# Patient Record
Sex: MALE | Race: WHITE | NOT HISPANIC OR LATINO | Employment: OTHER | RURAL
[De-identification: names, ages, dates, MRNs, and addresses within clinical notes are randomized per-mention and may not be internally consistent; named-entity substitution may affect disease eponyms.]

---

## 2020-09-21 ENCOUNTER — HISTORICAL (OUTPATIENT)
Dept: ADMINISTRATIVE | Facility: HOSPITAL | Age: 70
End: 2020-09-21

## 2021-12-20 ENCOUNTER — OFFICE VISIT (OUTPATIENT)
Dept: FAMILY MEDICINE | Facility: CLINIC | Age: 71
End: 2021-12-20
Payer: MEDICARE

## 2021-12-20 VITALS
HEART RATE: 82 BPM | BODY MASS INDEX: 25.62 KG/M2 | WEIGHT: 173 LBS | TEMPERATURE: 98 F | HEIGHT: 69 IN | DIASTOLIC BLOOD PRESSURE: 60 MMHG | SYSTOLIC BLOOD PRESSURE: 128 MMHG | OXYGEN SATURATION: 93 % | RESPIRATION RATE: 18 BRPM

## 2021-12-20 DIAGNOSIS — J44.1 COPD WITH ACUTE EXACERBATION: Primary | ICD-10-CM

## 2021-12-20 PROCEDURE — 96372 THER/PROPH/DIAG INJ SC/IM: CPT | Mod: ,,, | Performed by: NURSE PRACTITIONER

## 2021-12-20 PROCEDURE — 99213 PR OFFICE/OUTPT VISIT, EST, LEVL III, 20-29 MIN: ICD-10-PCS | Mod: 25,,, | Performed by: NURSE PRACTITIONER

## 2021-12-20 PROCEDURE — 99213 OFFICE O/P EST LOW 20 MIN: CPT | Mod: 25,,, | Performed by: NURSE PRACTITIONER

## 2021-12-20 PROCEDURE — 96372 PR INJECTION,THERAP/PROPH/DIAG2ST, IM OR SUBCUT: ICD-10-PCS | Mod: ,,, | Performed by: NURSE PRACTITIONER

## 2021-12-20 RX ORDER — LEVALBUTEROL TARTRATE 45 UG/1
AEROSOL, METERED ORAL
COMMUNITY
Start: 2021-11-26 | End: 2023-11-28

## 2021-12-20 RX ORDER — ROFLUMILAST 500 UG/1
1 TABLET ORAL DAILY
COMMUNITY
Start: 2021-11-29

## 2021-12-20 RX ORDER — DOXYCYCLINE 100 MG/1
100 CAPSULE ORAL EVERY 12 HOURS
Qty: 14 CAPSULE | Refills: 0 | Status: SHIPPED | OUTPATIENT
Start: 2021-12-20 | End: 2022-04-14

## 2021-12-20 RX ORDER — METHYLPREDNISOLONE ACETATE 40 MG/ML
40 INJECTION, SUSPENSION INTRA-ARTICULAR; INTRALESIONAL; INTRAMUSCULAR; SOFT TISSUE
Status: COMPLETED | OUTPATIENT
Start: 2021-12-20 | End: 2021-12-20

## 2021-12-20 RX ORDER — DEXAMETHASONE SODIUM PHOSPHATE 4 MG/ML
4 INJECTION, SOLUTION INTRA-ARTICULAR; INTRALESIONAL; INTRAMUSCULAR; INTRAVENOUS; SOFT TISSUE
Status: COMPLETED | OUTPATIENT
Start: 2021-12-20 | End: 2021-12-20

## 2021-12-20 RX ORDER — LEVALBUTEROL INHALATION SOLUTION 1.25 MG/3ML
SOLUTION RESPIRATORY (INHALATION)
COMMUNITY
Start: 2021-11-29

## 2021-12-20 RX ORDER — TAMSULOSIN HYDROCHLORIDE 0.4 MG/1
1 CAPSULE ORAL DAILY
COMMUNITY
Start: 2021-11-26

## 2021-12-20 RX ORDER — ALBUTEROL SULFATE 90 UG/1
AEROSOL, METERED RESPIRATORY (INHALATION)
COMMUNITY
Start: 2021-08-09 | End: 2023-11-28

## 2021-12-20 RX ADMIN — DEXAMETHASONE SODIUM PHOSPHATE 4 MG: 4 INJECTION, SOLUTION INTRA-ARTICULAR; INTRALESIONAL; INTRAMUSCULAR; INTRAVENOUS; SOFT TISSUE at 10:12

## 2021-12-20 RX ADMIN — METHYLPREDNISOLONE ACETATE 40 MG: 40 INJECTION, SUSPENSION INTRA-ARTICULAR; INTRALESIONAL; INTRAMUSCULAR; SOFT TISSUE at 10:12

## 2022-03-03 ENCOUNTER — APPOINTMENT (OUTPATIENT)
Dept: RADIOLOGY | Facility: CLINIC | Age: 72
End: 2022-03-03
Attending: NURSE PRACTITIONER
Payer: MEDICARE

## 2022-03-03 ENCOUNTER — OFFICE VISIT (OUTPATIENT)
Dept: FAMILY MEDICINE | Facility: CLINIC | Age: 72
End: 2022-03-03
Payer: MEDICARE

## 2022-03-03 VITALS
SYSTOLIC BLOOD PRESSURE: 130 MMHG | DIASTOLIC BLOOD PRESSURE: 70 MMHG | HEART RATE: 78 BPM | BODY MASS INDEX: 25.1 KG/M2 | HEIGHT: 69 IN | OXYGEN SATURATION: 94 % | TEMPERATURE: 98 F | WEIGHT: 169.5 LBS | RESPIRATION RATE: 18 BRPM

## 2022-03-03 DIAGNOSIS — R05.9 COUGH: ICD-10-CM

## 2022-03-03 DIAGNOSIS — R06.02 SHORTNESS OF BREATH: ICD-10-CM

## 2022-03-03 DIAGNOSIS — J44.1 COPD WITH ACUTE EXACERBATION: Primary | ICD-10-CM

## 2022-03-03 DIAGNOSIS — J01.00 ACUTE NON-RECURRENT MAXILLARY SINUSITIS: ICD-10-CM

## 2022-03-03 PROBLEM — F17.210 CIGARETTE SMOKER: Status: ACTIVE | Noted: 2022-03-03

## 2022-03-03 LAB
ALBUMIN SERPL BCP-MCNC: 3.4 G/DL (ref 3.5–5)
ALBUMIN/GLOB SERPL: 1.3 {RATIO}
ALP SERPL-CCNC: 87 U/L (ref 45–115)
ALT SERPL W P-5'-P-CCNC: 21 U/L (ref 16–61)
ANION GAP SERPL CALCULATED.3IONS-SCNC: 9 MMOL/L (ref 7–16)
AST SERPL W P-5'-P-CCNC: 15 U/L (ref 15–37)
BASOPHILS # BLD AUTO: 0.03 K/UL (ref 0–0.2)
BASOPHILS NFR BLD AUTO: 0.4 % (ref 0–1)
BILIRUB SERPL-MCNC: 0.8 MG/DL (ref 0–1.2)
BUN SERPL-MCNC: 6 MG/DL (ref 7–18)
BUN/CREAT SERPL: 8 (ref 6–20)
CALCIUM SERPL-MCNC: 8.3 MG/DL (ref 8.5–10.1)
CHLORIDE SERPL-SCNC: 104 MMOL/L (ref 98–107)
CO2 SERPL-SCNC: 36 MMOL/L (ref 21–32)
CREAT SERPL-MCNC: 0.77 MG/DL (ref 0.7–1.3)
CTP QC/QA: YES
DIFFERENTIAL METHOD BLD: ABNORMAL
EOSINOPHIL # BLD AUTO: 0.24 K/UL (ref 0–0.5)
EOSINOPHIL NFR BLD AUTO: 3.1 % (ref 1–4)
ERYTHROCYTE [DISTWIDTH] IN BLOOD BY AUTOMATED COUNT: 13.6 % (ref 11.5–14.5)
FLUAV AG NPH QL: NEGATIVE
FLUBV AG NPH QL: NEGATIVE
GLOBULIN SER-MCNC: 2.6 G/DL (ref 2–4)
GLUCOSE SERPL-MCNC: 101 MG/DL (ref 74–106)
HCT VFR BLD AUTO: 47.4 % (ref 40–54)
HGB BLD-MCNC: 15.7 G/DL (ref 13.5–18)
LYMPHOCYTES # BLD AUTO: 0.92 K/UL (ref 1–4.8)
LYMPHOCYTES NFR BLD AUTO: 11.7 % (ref 27–41)
MCH RBC QN AUTO: 29.4 PG (ref 27–31)
MCHC RBC AUTO-ENTMCNC: 33.1 G/DL (ref 32–36)
MCV RBC AUTO: 88.8 FL (ref 80–96)
MONOCYTES # BLD AUTO: 0.59 K/UL (ref 0–0.8)
MONOCYTES NFR BLD AUTO: 7.5 % (ref 2–6)
MPC BLD CALC-MCNC: 10.2 FL (ref 9.4–12.4)
NEUTROPHILS # BLD AUTO: 6.06 K/UL (ref 1.8–7.7)
NEUTROPHILS NFR BLD AUTO: 77.3 % (ref 53–65)
PLATELET # BLD AUTO: 199 K/UL (ref 150–400)
POTASSIUM SERPL-SCNC: 3.8 MMOL/L (ref 3.5–5.1)
PROT SERPL-MCNC: 6 G/DL (ref 6.4–8.2)
RBC # BLD AUTO: 5.34 M/UL (ref 4.6–6.2)
SARS-COV-2 AG RESP QL IA.RAPID: NEGATIVE
SODIUM SERPL-SCNC: 145 MMOL/L (ref 136–145)
WBC # BLD AUTO: 7.84 K/UL (ref 4.5–11)

## 2022-03-03 PROCEDURE — 96372 PR INJECTION,THERAP/PROPH/DIAG2ST, IM OR SUBCUT: ICD-10-PCS | Mod: ,,, | Performed by: NURSE PRACTITIONER

## 2022-03-03 PROCEDURE — 87428 SARSCOV & INF VIR A&B AG IA: CPT | Mod: RHCUB | Performed by: NURSE PRACTITIONER

## 2022-03-03 PROCEDURE — 96372 THER/PROPH/DIAG INJ SC/IM: CPT | Mod: ,,, | Performed by: NURSE PRACTITIONER

## 2022-03-03 PROCEDURE — 94640 AIRWAY INHALATION TREATMENT: CPT | Mod: ,,, | Performed by: NURSE PRACTITIONER

## 2022-03-03 PROCEDURE — 71046 X-RAY EXAM CHEST 2 VIEWS: CPT | Mod: 26,,, | Performed by: RADIOLOGY

## 2022-03-03 PROCEDURE — 71046 X-RAY EXAM CHEST 2 VIEWS: CPT | Mod: TC,RHCUB | Performed by: NURSE PRACTITIONER

## 2022-03-03 PROCEDURE — 94640 PR INHAL RX, AIRWAY OBST/DX SPUTUM INDUCT: ICD-10-PCS | Mod: ,,, | Performed by: NURSE PRACTITIONER

## 2022-03-03 PROCEDURE — 71046 XR CHEST PA AND LATERAL: ICD-10-PCS | Mod: 26,,, | Performed by: RADIOLOGY

## 2022-03-03 PROCEDURE — 99214 PR OFFICE/OUTPT VISIT, EST, LEVL IV, 30-39 MIN: ICD-10-PCS | Mod: ,,, | Performed by: NURSE PRACTITIONER

## 2022-03-03 PROCEDURE — 99214 OFFICE O/P EST MOD 30 MIN: CPT | Mod: ,,, | Performed by: NURSE PRACTITIONER

## 2022-03-03 RX ORDER — LINCOMYCIN HYDROCHLORIDE 300 MG/ML
600 INJECTION, SOLUTION INTRAMUSCULAR; INTRAVENOUS; SUBCONJUNCTIVAL
Status: COMPLETED | OUTPATIENT
Start: 2022-03-03 | End: 2022-03-03

## 2022-03-03 RX ORDER — IPRATROPIUM BROMIDE AND ALBUTEROL SULFATE 2.5; .5 MG/3ML; MG/3ML
3 SOLUTION RESPIRATORY (INHALATION) EVERY 6 HOURS PRN
Qty: 75 ML | Refills: 0 | Status: SHIPPED | OUTPATIENT
Start: 2022-03-03 | End: 2022-03-03

## 2022-03-03 RX ORDER — CHLORPHENIRAMINE MALEATE AND PHENYLEPHRINE HYDROCHLORIDE 4; 10 MG/1; MG/1
1 TABLET, COATED ORAL EVERY 6 HOURS PRN
Qty: 30 TABLET | Refills: 0 | Status: SHIPPED | OUTPATIENT
Start: 2022-03-03 | End: 2022-03-13

## 2022-03-03 RX ORDER — PREDNISONE 10 MG/1
10 TABLET ORAL DAILY
Qty: 10 TABLET | Refills: 0 | Status: SHIPPED | OUTPATIENT
Start: 2022-03-03 | End: 2022-04-14

## 2022-03-03 RX ORDER — IPRATROPIUM BROMIDE AND ALBUTEROL SULFATE 2.5; .5 MG/3ML; MG/3ML
3 SOLUTION RESPIRATORY (INHALATION)
Status: COMPLETED | OUTPATIENT
Start: 2022-03-03 | End: 2022-03-03

## 2022-03-03 RX ORDER — DOXYCYCLINE 100 MG/1
100 CAPSULE ORAL 2 TIMES DAILY
Qty: 20 CAPSULE | Refills: 0 | Status: SHIPPED | OUTPATIENT
Start: 2022-03-03 | End: 2022-04-14

## 2022-03-03 RX ORDER — DEXAMETHASONE SODIUM PHOSPHATE 4 MG/ML
4 INJECTION, SOLUTION INTRA-ARTICULAR; INTRALESIONAL; INTRAMUSCULAR; INTRAVENOUS; SOFT TISSUE
Status: COMPLETED | OUTPATIENT
Start: 2022-03-03 | End: 2022-03-03

## 2022-03-03 RX ADMIN — DEXAMETHASONE SODIUM PHOSPHATE 4 MG: 4 INJECTION, SOLUTION INTRA-ARTICULAR; INTRALESIONAL; INTRAMUSCULAR; INTRAVENOUS; SOFT TISSUE at 10:03

## 2022-03-03 RX ADMIN — IPRATROPIUM BROMIDE AND ALBUTEROL SULFATE 3 ML: 2.5; .5 SOLUTION RESPIRATORY (INHALATION) at 12:03

## 2022-03-03 RX ADMIN — LINCOMYCIN HYDROCHLORIDE 600 MG: 300 INJECTION, SOLUTION INTRAMUSCULAR; INTRAVENOUS; SUBCONJUNCTIVAL at 10:03

## 2022-03-03 NOTE — PROGRESS NOTES
Ofe Oliver NP   [facility]  [address]  [place]     PATIENT NAME: Geoffrey Gleason  : 1950  DATE: 3/3/22  MRN: [id number]      Billing Provider: Ofe Oliver NP  Level of Service:   Patient PCP Information     Provider PCP Type    Ofe Oliver NP General          Reason for Visit / Chief Complaint: Follow-up, COPD, Nasal Congestion, Cough, and Headache       Update PCP  Update Chief Complaint         History of Present Illness / Problem Focused Workflow     Geoffrey Gleason presents to the clinic copd, nasal congestion, cough, headache for a couple of days, o2 sats 78-87%      Review of Systems     Review of Systems   Constitutional: Negative for chills, fatigue and fever.   HENT: Positive for sinus pressure/congestion. Negative for nasal congestion, ear pain, facial swelling, hearing loss, mouth dryness, mouth sores, postnasal drip, rhinorrhea and goiter.    Eyes: Negative for discharge and itching.   Respiratory: Positive for cough and shortness of breath. Negative for wheezing.    Cardiovascular: Negative for chest pain and leg swelling.   Gastrointestinal: Negative for abdominal pain, change in bowel habit and change in bowel habit.   Genitourinary: Negative for difficulty urinating, dysuria, enuresis, frequency, hematuria and urgency.   Neurological: Positive for headaches. Negative for dizziness, vertigo, syncope and weakness.   Psychiatric/Behavioral: Negative for decreased concentration.        Medical / Social / Family History     Past Medical History:   Diagnosis Date    COPD (chronic obstructive pulmonary disease)        History reviewed. No pertinent surgical history.    Social History    reports that he has been smoking cigarettes. He has never used smokeless tobacco. He reports previous alcohol use. He reports that he does not use drugs.    Family History  's family history is not on file.    Medications and Allergies     Medications  Outpatient Medications Marked  "as Taking for the 3/3/22 encounter (Office Visit) with Ofe Oliver NP   Medication Sig Dispense Refill    albuterol (PROVENTIL/VENTOLIN HFA) 90 mcg/actuation inhaler INHALE TWO PUFFS TWICE DAILY AS NEEDED      DALIRESP 500 mcg Tab Take 1 tablet by mouth once daily.      doxycycline (MONODOX) 100 MG capsule Take 1 capsule (100 mg total) by mouth every 12 (twelve) hours. 14 capsule 0    gabapentin (NEURONTIN) 300 MG capsule TAKE ONE CAPSULE BY MOUTH AT BEDTIME 90 capsule 0    levalbuterol (XOPENEX HFA) 45 mcg/actuation inhaler INHALE ONE PUFF BY MOUTH EVERY 4 HOURS AS NEEDED      levalbuterol (XOPENEX) 1.25 mg/3 mL nebulizer solution USE ONE vial via NEBULIZER EVERY 8 HOURS AS NEEDED      tamsulosin (FLOMAX) 0.4 mg Cap Take 1 capsule by mouth once daily.       Current Facility-Administered Medications for the 3/3/22 encounter (Office Visit) with Ofe Oliver NP   Medication Dose Route Frequency Provider Last Rate Last Admin    [COMPLETED] dexamethasone injection 4 mg  4 mg Intramuscular 1 time in Clinic/HOD Ofe Oliver NP   4 mg at 03/03/22 1016    [COMPLETED] lincomycin injection 600 mg  600 mg Intramuscular 1 time in Clinic/HOD Ofe Oliver NP   600 mg at 03/03/22 1017       Allergies  Review of patient's allergies indicates:   Allergen Reactions    Cephalexin Hives and Other (See Comments)     Swelling SOB      Amoxicillin Rash       Physical Examination     Vitals:    03/03/22 0942 03/03/22 1021   BP: 130/70    BP Location: Right arm    Patient Position: Sitting    BP Method: Medium (Manual)    Pulse: 78    Resp: 18    Temp: 98.4 °F (36.9 °C)    TempSrc: Oral    SpO2: (!) 87% (!) 94%  Comment: on o2 @ 2 liters   Weight: 76.9 kg (169 lb 8 oz)    Height: 5' 9" (1.753 m)       Physical Exam  Vitals and nursing note reviewed.   Constitutional:       Appearance: Normal appearance.   HENT:      Head: Normocephalic.      Right Ear: Tympanic membrane, ear canal and external ear normal.      Left " Ear: Tympanic membrane, ear canal and external ear normal.      Nose: Nose normal.      Comments: Mod amt thick yellow nasal secretion, pressure over max region     Mouth/Throat:      Mouth: Mucous membranes are moist.      Pharynx: Oropharynx is clear.   Eyes:      Extraocular Movements: Extraocular movements intact.      Conjunctiva/sclera: Conjunctivae normal.      Pupils: Pupils are equal, round, and reactive to light.   Neck:      Comments: Bhavin ant cervical nodes  Cardiovascular:      Rate and Rhythm: Normal rate and regular rhythm.      Pulses: Normal pulses.      Heart sounds: Normal heart sounds.   Pulmonary:      Effort: Pulmonary effort is normal.      Breath sounds: Normal breath sounds.      Comments: Coarse breath sounds noted, sl cleared after 1 hhn with duoneb. o2 started at 2 liters, and o2 sat increased to 92%  Abdominal:      General: Bowel sounds are normal.      Palpations: Abdomen is soft.   Musculoskeletal:         General: Normal range of motion.      Cervical back: Normal range of motion and neck supple.   Lymphadenopathy:      Cervical: Cervical adenopathy present.   Skin:     General: Skin is warm and dry.      Capillary Refill: Capillary refill takes less than 2 seconds.   Neurological:      General: No focal deficit present.      Mental Status: He is alert and oriented to person, place, and time.   Psychiatric:         Mood and Affect: Mood normal.         Behavior: Behavior normal.          Assessment and Plan (including Health Maintenance)      Problem List  Smart Sets  Document Outside HM   :    Plan: avoid irritants, discussed need to stop smoking, but pt has no desire to quit at this time, stressed to use o2 daily, go to er if needed    Cough  -     X-Ray Chest PA And Lateral; Future; Expected date: 03/03/2022    Shortness of breath  -     X-Ray Chest PA And Lateral; Future; Expected date: 03/03/2022  -     CBC Auto Differential; Future; Expected date: 03/03/2022  -     Comprehensive  Metabolic Panel; Future; Expected date: 03/03/2022  -     POCT SARS-COV2 (COVID) with Flu Antigen    COPD with acute exacerbation    Other orders  -     lincomycin injection 600 mg  -     dexamethasone injection 4 mg            Health Maintenance Due   Topic Date Due    Hepatitis C Screening  Never done    Lipid Panel  Never done    COVID-19 Vaccine (1) Never done    TETANUS VACCINE  Never done    Colorectal Cancer Screening  Never done    Shingles Vaccine (1 of 2) Never done    Abdominal Aortic Aneurysm Screening  Never done    Pneumococcal Vaccines (Age 65+) (1 of 1 - PPSV23) 09/01/2016       Problem List Items Addressed This Visit    None     Visit Diagnoses     Cough    -  Primary    Relevant Orders    X-Ray Chest PA And Lateral (Completed)    Shortness of breath        Relevant Orders    X-Ray Chest PA And Lateral (Completed)    CBC Auto Differential (Completed)    Comprehensive Metabolic Panel    POCT SARS-COV2 (COVID) with Flu Antigen (Completed)    COPD with acute exacerbation                The patient has no Health Maintenance topics of status Not Due    Procedures     No future appointments.     No follow-ups on file.       Signature:  Ofe Oliver NP    Date of encounter: 3/3/22

## 2022-03-10 ENCOUNTER — OFFICE VISIT (OUTPATIENT)
Dept: FAMILY MEDICINE | Facility: CLINIC | Age: 72
End: 2022-03-10
Payer: MEDICARE

## 2022-03-10 VITALS
SYSTOLIC BLOOD PRESSURE: 136 MMHG | OXYGEN SATURATION: 93 % | HEART RATE: 87 BPM | BODY MASS INDEX: 25.21 KG/M2 | TEMPERATURE: 98 F | HEIGHT: 69 IN | RESPIRATION RATE: 18 BRPM | WEIGHT: 170.25 LBS | DIASTOLIC BLOOD PRESSURE: 70 MMHG

## 2022-03-10 DIAGNOSIS — J44.1 CHRONIC OBSTRUCTIVE PULMONARY DISEASE WITH ACUTE EXACERBATION: Primary | ICD-10-CM

## 2022-03-10 PROCEDURE — 96372 THER/PROPH/DIAG INJ SC/IM: CPT | Mod: ,,, | Performed by: NURSE PRACTITIONER

## 2022-03-10 PROCEDURE — 99214 PR OFFICE/OUTPT VISIT, EST, LEVL IV, 30-39 MIN: ICD-10-PCS | Mod: ,,, | Performed by: NURSE PRACTITIONER

## 2022-03-10 PROCEDURE — 99214 OFFICE O/P EST MOD 30 MIN: CPT | Mod: ,,, | Performed by: NURSE PRACTITIONER

## 2022-03-10 PROCEDURE — 96372 PR INJECTION,THERAP/PROPH/DIAG2ST, IM OR SUBCUT: ICD-10-PCS | Mod: ,,, | Performed by: NURSE PRACTITIONER

## 2022-03-10 RX ORDER — DEXAMETHASONE SODIUM PHOSPHATE 4 MG/ML
4 INJECTION, SOLUTION INTRA-ARTICULAR; INTRALESIONAL; INTRAMUSCULAR; INTRAVENOUS; SOFT TISSUE
Status: COMPLETED | OUTPATIENT
Start: 2022-03-10 | End: 2022-03-10

## 2022-03-10 RX ORDER — METHYLPREDNISOLONE ACETATE 40 MG/ML
40 INJECTION, SUSPENSION INTRA-ARTICULAR; INTRALESIONAL; INTRAMUSCULAR; SOFT TISSUE
Status: COMPLETED | OUTPATIENT
Start: 2022-03-10 | End: 2022-03-10

## 2022-03-10 RX ORDER — METHYLPREDNISOLONE 4 MG/1
TABLET ORAL
Qty: 21 EACH | Refills: 0 | Status: SHIPPED | OUTPATIENT
Start: 2022-03-10 | End: 2022-03-31

## 2022-03-10 RX ORDER — AZITHROMYCIN 250 MG/1
TABLET, FILM COATED ORAL
Qty: 6 TABLET | Refills: 0 | Status: SHIPPED | OUTPATIENT
Start: 2022-03-10 | End: 2022-03-15

## 2022-03-10 RX ADMIN — METHYLPREDNISOLONE ACETATE 40 MG: 40 INJECTION, SUSPENSION INTRA-ARTICULAR; INTRALESIONAL; INTRAMUSCULAR; SOFT TISSUE at 12:03

## 2022-03-10 RX ADMIN — DEXAMETHASONE SODIUM PHOSPHATE 4 MG: 4 INJECTION, SOLUTION INTRA-ARTICULAR; INTRALESIONAL; INTRAMUSCULAR; INTRAVENOUS; SOFT TISSUE at 12:03

## 2022-03-10 NOTE — PROGRESS NOTES
Ofe Oliver NP   [facility]  [address]  [place]     PATIENT NAME: Geoffrey Gleason  : 1950  DATE: 3/10/22  MRN: [id number]      Billing Provider: Ofe Oliver NP  Level of Service:   Patient PCP Information     Provider PCP Type    Ofe Oliver NP General          Reason for Visit / Chief Complaint: Follow-up (One week follow up ) and COPD       Update PCP  Update Chief Complaint         History of Present Illness / Problem Focused Workflow     Geoffrey Gleason presents to the clinic   Here for eval of exac of copd, stated that he is feeling some better, o2 sats have been staying in the high 80s    Review of Systems     Review of Systems   Constitutional: Negative for chills, fatigue and fever.   HENT: Negative for nasal congestion, ear pain, facial swelling, hearing loss, mouth dryness, mouth sores, postnasal drip, rhinorrhea, sinus pressure/congestion and goiter.    Eyes: Negative for discharge and itching.   Respiratory: Positive for cough and shortness of breath (mild). Negative for wheezing.    Cardiovascular: Negative for chest pain and leg swelling.   Gastrointestinal: Negative for abdominal pain, change in bowel habit and change in bowel habit.   Genitourinary: Negative for difficulty urinating, dysuria, enuresis, frequency, hematuria and urgency.   Neurological: Negative for dizziness, vertigo, syncope, weakness and headaches.   Psychiatric/Behavioral: Negative for decreased concentration.        Medical / Social / Family History     Past Medical History:   Diagnosis Date    COPD (chronic obstructive pulmonary disease)        History reviewed. No pertinent surgical history.    Social History    reports that he has been smoking cigarettes. He has never used smokeless tobacco. He reports previous alcohol use. He reports that he does not use drugs.    Family History  's family history is not on file.    Medications and Allergies     Medications  Outpatient Medications  "Marked as Taking for the 3/10/22 encounter (Office Visit) with Ofe Oliver NP   Medication Sig Dispense Refill    albuterol (PROVENTIL/VENTOLIN HFA) 90 mcg/actuation inhaler INHALE TWO PUFFS TWICE DAILY AS NEEDED      chlorpheniramine-phenylephrine (ED A-HIST) 4-10 mg per tablet Take 1 tablet by mouth every 6 (six) hours as needed for Congestion. 30 tablet 0    DALIRESP 500 mcg Tab Take 1 tablet by mouth once daily.      doxycycline (MONODOX) 100 MG capsule Take 1 capsule (100 mg total) by mouth every 12 (twelve) hours. 14 capsule 0    gabapentin (NEURONTIN) 300 MG capsule TAKE ONE CAPSULE BY MOUTH AT BEDTIME 90 capsule 0    levalbuterol (XOPENEX HFA) 45 mcg/actuation inhaler INHALE ONE PUFF BY MOUTH EVERY 4 HOURS AS NEEDED      levalbuterol (XOPENEX) 1.25 mg/3 mL nebulizer solution USE ONE vial via NEBULIZER EVERY 8 HOURS AS NEEDED      predniSONE (DELTASONE) 10 MG tablet Take 1 tablet (10 mg total) by mouth once daily. 10 tablet 0    tamsulosin (FLOMAX) 0.4 mg Cap Take 1 capsule by mouth once daily.       Current Facility-Administered Medications for the 3/10/22 encounter (Office Visit) with Ofe Oliver NP   Medication Dose Route Frequency Provider Last Rate Last Admin    [COMPLETED] dexamethasone injection 4 mg  4 mg Intramuscular 1 time in Clinic/HOD Ofe Oliver NP   4 mg at 03/10/22 1216    [COMPLETED] methylPREDNISolone acetate injection 40 mg  40 mg Intramuscular 1 time in Clinic/HOD Ofe Oliver NP   40 mg at 03/10/22 1217       Allergies  Review of patient's allergies indicates:   Allergen Reactions    Cephalexin Hives and Other (See Comments)     Swelling SOB      Amoxicillin Rash       Physical Examination     Vitals:    03/10/22 1141   BP: 136/70   BP Location: Left arm   Patient Position: Sitting   BP Method: Medium (Manual)   Pulse: 87   Resp: 18   Temp: 97.9 °F (36.6 °C)   TempSrc: Oral   SpO2: (!) 93%   Weight: 77.2 kg (170 lb 4 oz)   Height: 5' 9" (1.753 m)      Physical " Exam  Vitals and nursing note reviewed.   Constitutional:       Appearance: Normal appearance.   HENT:      Head: Normocephalic.      Right Ear: Tympanic membrane, ear canal and external ear normal.      Left Ear: Tympanic membrane, ear canal and external ear normal.      Nose: Nose normal.      Mouth/Throat:      Mouth: Mucous membranes are moist.      Pharynx: Oropharynx is clear.   Eyes:      Extraocular Movements: Extraocular movements intact.      Conjunctiva/sclera: Conjunctivae normal.      Pupils: Pupils are equal, round, and reactive to light.   Cardiovascular:      Rate and Rhythm: Normal rate and regular rhythm.      Pulses: Normal pulses.      Heart sounds: Normal heart sounds.   Pulmonary:      Effort: Pulmonary effort is normal.      Breath sounds: Normal breath sounds.      Comments: Sl decreased resp  Abdominal:      General: Bowel sounds are normal.      Palpations: Abdomen is soft.   Musculoskeletal:         General: Normal range of motion.   Skin:     General: Skin is warm and dry.      Capillary Refill: Capillary refill takes less than 2 seconds.   Neurological:      General: No focal deficit present.      Mental Status: He is alert and oriented to person, place, and time.   Psychiatric:         Mood and Affect: Mood normal.         Behavior: Behavior normal.          Assessment and Plan (including Health Maintenance)      Problem List  Smart Sets  Document Outside HM   :    Plan: avoid irritants, meds as ordered, return to clinic as scheduled and as needed    Chronic obstructive pulmonary disease with acute exacerbation  -     dexamethasone injection 4 mg  -     methylPREDNISolone acetate injection 40 mg    Other orders  -     methylPREDNISolone (MEDROL DOSEPACK) 4 mg tablet; use as directed  Dispense: 21 each; Refill: 0  -     azithromycin (Z-MARCIA) 250 MG tablet; Take 2 tablets by mouth on day 1; Take 1 tablet by mouth on days 2-5  Dispense: 6 tablet; Refill: 0            Health Maintenance Due    Topic Date Due    Hepatitis C Screening  Never done    Lipid Panel  Never done    COVID-19 Vaccine (1) Never done    TETANUS VACCINE  Never done    Colorectal Cancer Screening  Never done    Shingles Vaccine (1 of 2) Never done    Abdominal Aortic Aneurysm Screening  Never done    Pneumococcal Vaccines (Age 65+) (1 of 1 - PPSV23) 09/01/2016       Problem List Items Addressed This Visit        Pulmonary    Chronic obstructive pulmonary disease - Primary    Relevant Medications    dexamethasone injection 4 mg (Completed)    methylPREDNISolone acetate injection 40 mg (Completed)            The patient has no Health Maintenance topics of status Not Due    Procedures     Future Appointments   Date Time Provider Department Center   4/11/2022  9:30 AM AWV NURSE, [facility]        Follow up for as scheduled.       Signature:  Ofe Oliver NP    Date of encounter: 3/10/22

## 2022-03-11 DIAGNOSIS — Z71.89 COMPLEX CARE COORDINATION: ICD-10-CM

## 2022-04-14 ENCOUNTER — OFFICE VISIT (OUTPATIENT)
Dept: FAMILY MEDICINE | Facility: CLINIC | Age: 72
End: 2022-04-14
Payer: MEDICARE

## 2022-04-14 VITALS
SYSTOLIC BLOOD PRESSURE: 114 MMHG | TEMPERATURE: 99 F | BODY MASS INDEX: 25.35 KG/M2 | HEIGHT: 69 IN | OXYGEN SATURATION: 92 % | DIASTOLIC BLOOD PRESSURE: 60 MMHG | RESPIRATION RATE: 22 BRPM | WEIGHT: 171.13 LBS | HEART RATE: 100 BPM

## 2022-04-14 DIAGNOSIS — M25.472 SWELLING OF BOTH ANKLES: ICD-10-CM

## 2022-04-14 DIAGNOSIS — L03.818 CELLULITIS OF OTHER SPECIFIED SITE: ICD-10-CM

## 2022-04-14 DIAGNOSIS — M25.471 SWELLING OF BOTH ANKLES: ICD-10-CM

## 2022-04-14 DIAGNOSIS — R06.02 SHORTNESS OF BREATH: Primary | ICD-10-CM

## 2022-04-14 PROBLEM — L03.90 CELLULITIS: Status: ACTIVE | Noted: 2022-04-14

## 2022-04-14 LAB
BILIRUB SERPL-MCNC: NEGATIVE MG/DL
BLOOD URINE, POC: NEGATIVE
COLOR, POC UA: YELLOW
GLUCOSE UR QL STRIP: NEGATIVE
KETONES UR QL STRIP: NEGATIVE
LEUKOCYTE ESTERASE URINE, POC: NEGATIVE
NITRITE, POC UA: NEGATIVE
PH, POC UA: 7
PROTEIN, POC: NEGATIVE
SPECIFIC GRAVITY, POC UA: 1.01
UROBILINOGEN, POC UA: 2

## 2022-04-14 PROCEDURE — 99214 OFFICE O/P EST MOD 30 MIN: CPT | Mod: ,,, | Performed by: NURSE PRACTITIONER

## 2022-04-14 PROCEDURE — 81003 URINALYSIS AUTO W/O SCOPE: CPT | Mod: RHCUB | Performed by: NURSE PRACTITIONER

## 2022-04-14 PROCEDURE — 99214 PR OFFICE/OUTPT VISIT, EST, LEVL IV, 30-39 MIN: ICD-10-PCS | Mod: ,,, | Performed by: NURSE PRACTITIONER

## 2022-04-14 RX ORDER — IPRATROPIUM BROMIDE AND ALBUTEROL SULFATE 2.5; .5 MG/3ML; MG/3ML
SOLUTION RESPIRATORY (INHALATION) EVERY 6 HOURS PRN
COMMUNITY
Start: 2022-03-03 | End: 2023-11-28

## 2022-04-14 RX ORDER — FUROSEMIDE 20 MG/1
TABLET ORAL
Qty: 30 TABLET | Refills: 11 | Status: SHIPPED | OUTPATIENT
Start: 2022-04-14 | End: 2022-11-07

## 2022-04-14 RX ORDER — DOXYCYCLINE 100 MG/1
100 CAPSULE ORAL EVERY 12 HOURS
Qty: 20 CAPSULE | Refills: 0 | Status: SHIPPED | OUTPATIENT
Start: 2022-04-14 | End: 2022-11-07

## 2022-04-14 NOTE — PROGRESS NOTES
Ofe Oliver NP   [facility]  [address]  [place]     PATIENT NAME: Geoffrey Gleason  : 1950  DATE: 22  MRN: [id number]      Billing Provider: Ofe Oliver NP  Level of Service:   Patient PCP Information     Provider PCP Type    Ofe Oliver NP General          Reason for Visit / Chief Complaint: Leg Pain (C/o pain and swelling of lower legs and feet x several weeks.  States his legs and feet are ok in the morninigs but his legs and feet swell and hurt by the end of the day.  C/o skin on legs and feet are red, itch and burn)       Update PCP  Update Chief Complaint         History of Present Illness / Problem Focused Workflow     Geoffrey Gleason presents to the clinic   C/o leg pain, c/o pain and swelling of lower legs and feet x several weeks, staes his legs and feet are ok in the am but by the end of day they hurt and are swollen. C/o skin on legs and feet are red, itch and burn, wants referral to dr vega, staed only using his o2 at hs  ,   Review of Systems     Review of Systems   Constitutional: Negative for chills, fatigue and fever.   HENT: Negative for nasal congestion, ear pain, facial swelling, hearing loss, mouth dryness, mouth sores, postnasal drip, rhinorrhea, sinus pressure/congestion and goiter.    Eyes: Negative for discharge and itching.   Respiratory: Negative for cough, shortness of breath and wheezing.    Cardiovascular: Negative for chest pain and leg swelling.   Gastrointestinal: Negative for abdominal pain, change in bowel habit and change in bowel habit.   Genitourinary: Negative for difficulty urinating, dysuria, enuresis, frequency, hematuria and urgency.   Musculoskeletal:        Swelling of lower ext. , katelyn   Integumentary:         Mild erythema of lower ext.    Neurological: Negative for dizziness, vertigo, syncope, weakness and headaches.   Psychiatric/Behavioral: Negative for decreased concentration.        Medical / Social / Family History  "    Past Medical History:   Diagnosis Date    COPD (chronic obstructive pulmonary disease)        History reviewed. No pertinent surgical history.    Social History    reports that he has been smoking cigarettes. He started smoking about 54 years ago. He has smoked for the past 54.00 years. He has never used smokeless tobacco. He reports previous alcohol use. He reports that he does not use drugs.    Family History  's family history is not on file.    Medications and Allergies     Medications  Outpatient Medications Marked as Taking for the 4/14/22 encounter (Office Visit) with Ofe Oliver NP   Medication Sig Dispense Refill    albuterol (PROVENTIL/VENTOLIN HFA) 90 mcg/actuation inhaler INHALE TWO PUFFS TWICE DAILY AS NEEDED      albuterol-ipratropium (DUO-NEB) 2.5 mg-0.5 mg/3 mL nebulizer solution Take by nebulization every 6 (six) hours as needed.      DALIRESP 500 mcg Tab Take 1 tablet by mouth once daily.      gabapentin (NEURONTIN) 300 MG capsule TAKE ONE CAPSULE BY MOUTH AT BEDTIME 90 capsule 0    levalbuterol (XOPENEX HFA) 45 mcg/actuation inhaler INHALE ONE PUFF BY MOUTH EVERY 4 HOURS AS NEEDED      levalbuterol (XOPENEX) 1.25 mg/3 mL nebulizer solution USE ONE vial via NEBULIZER EVERY 8 HOURS AS NEEDED      tamsulosin (FLOMAX) 0.4 mg Cap Take 1 capsule by mouth once daily.         Allergies  Review of patient's allergies indicates:   Allergen Reactions    Cephalexin Hives and Other (See Comments)     Swelling SOB      Amoxicillin Rash       Physical Examination     Vitals:    04/14/22 1103   BP: 114/60   BP Location: Left arm   Patient Position: Sitting   BP Method: Medium (Manual)   Pulse: 100   Resp: (!) 22   Temp: 98.6 °F (37 °C)   TempSrc: Oral   SpO2: (!) 92%   Weight: 77.6 kg (171 lb 2 oz)   Height: 5' 9.02" (1.753 m)      Physical Exam  Vitals and nursing note reviewed.   Constitutional:       Appearance: Normal appearance.   HENT:      Head: Normocephalic.      Right Ear: " Tympanic membrane, ear canal and external ear normal.      Left Ear: Tympanic membrane, ear canal and external ear normal.      Nose: Nose normal.      Mouth/Throat:      Mouth: Mucous membranes are moist.      Pharynx: Oropharynx is clear.   Eyes:      Extraocular Movements: Extraocular movements intact.      Conjunctiva/sclera: Conjunctivae normal.      Pupils: Pupils are equal, round, and reactive to light.   Cardiovascular:      Rate and Rhythm: Normal rate and regular rhythm.      Pulses: Normal pulses.      Heart sounds: Normal heart sounds.   Pulmonary:      Effort: Pulmonary effort is normal.      Breath sounds: Normal breath sounds.   Abdominal:      General: Bowel sounds are normal.      Palpations: Abdomen is soft.   Musculoskeletal:         General: Normal range of motion.      Right lower leg: Edema present.      Left lower leg: Edema present.      Comments: katelyn swelling lower legs, to mid calf. 2 plus pitting edema   Skin:     General: Skin is warm and dry.      Capillary Refill: Capillary refill takes less than 2 seconds.   Neurological:      General: No focal deficit present.      Mental Status: He is alert and oriented to person, place, and time.   Psychiatric:         Mood and Affect: Mood normal.         Behavior: Behavior normal.          Assessment and Plan (including Health Maintenance)      Problem List  Smart Sets  Document Outside HM   :    Plan: meds as ordered, elevate, keep all scheduled bety, return to clinic Monday for eval    Shortness of breath  -     X-Ray Chest PA And Lateral; Future; Expected date: 04/14/2022  -     Ambulatory referral/consult to Cardiology; Future; Expected date: 04/21/2022    Swelling of both ankles  -     POCT URINALYSIS W/O SCOPE  -     Ambulatory referral/consult to Cardiology; Future; Expected date: 04/21/2022  -     doxycycline (MONODOX) 100 MG capsule; Take 1 capsule (100 mg total) by mouth every 12 (twelve) hours.  Dispense: 20 capsule; Refill:  0    Cellulitis of other specified site  -     doxycycline (MONODOX) 100 MG capsule; Take 1 capsule (100 mg total) by mouth every 12 (twelve) hours.  Dispense: 20 capsule; Refill: 0    Other orders  -     furosemide (LASIX) 20 MG tablet; Take 1 tablet po dialy x 3 then weigh daily and if more than 3 pound wt gain, take 1 tablets  Dispense: 30 tablet; Refill: 11            Health Maintenance Due   Topic Date Due    Hepatitis C Screening  Never done    TETANUS VACCINE  Never done    Shingles Vaccine (1 of 2) Never done    Abdominal Aortic Aneurysm Screening  Never done    Lipid Panel  09/27/2021       Problem List Items Addressed This Visit        Pulmonary    Shortness of breath - Primary    Relevant Orders    X-Ray Chest PA And Lateral    Ambulatory referral/consult to Cardiology      Other Visit Diagnoses     Swelling of both ankles        Relevant Medications    doxycycline (MONODOX) 100 MG capsule    Other Relevant Orders    POCT URINALYSIS W/O SCOPE (Completed)    Ambulatory referral/consult to Cardiology    Cellulitis of other specified site        Relevant Medications    doxycycline (MONODOX) 100 MG capsule            Health Maintenance Topics with due status: Not Due       Topic Last Completion Date    Colorectal Cancer Screening 02/05/2016    DEXA Scan 09/21/2020       Procedures     No future appointments.     No follow-ups on file.       Signature:  Ofe Oliver NP    Date of encounter: 4/14/22

## 2022-04-18 ENCOUNTER — OFFICE VISIT (OUTPATIENT)
Dept: FAMILY MEDICINE | Facility: CLINIC | Age: 72
End: 2022-04-18
Payer: MEDICARE

## 2022-04-18 VITALS
HEART RATE: 91 BPM | HEIGHT: 69 IN | DIASTOLIC BLOOD PRESSURE: 62 MMHG | BODY MASS INDEX: 25.98 KG/M2 | RESPIRATION RATE: 18 BRPM | OXYGEN SATURATION: 85 % | WEIGHT: 175.38 LBS | TEMPERATURE: 98 F | SYSTOLIC BLOOD PRESSURE: 118 MMHG

## 2022-04-18 DIAGNOSIS — L03.116 CELLULITIS OF LEFT LOWER EXTREMITY: Primary | ICD-10-CM

## 2022-04-18 DIAGNOSIS — L03.115 CELLULITIS OF RIGHT LOWER EXTREMITY: ICD-10-CM

## 2022-04-18 DIAGNOSIS — M79.89 SWELLING OF BOTH LOWER EXTREMITIES: ICD-10-CM

## 2022-04-18 DIAGNOSIS — J44.9 CHRONIC OBSTRUCTIVE PULMONARY DISEASE, UNSPECIFIED COPD TYPE: ICD-10-CM

## 2022-04-18 PROBLEM — R06.02 SHORTNESS OF BREATH: Status: RESOLVED | Noted: 2022-03-03 | Resolved: 2022-04-18

## 2022-04-18 PROBLEM — R05.9 COUGH: Status: RESOLVED | Noted: 2022-03-03 | Resolved: 2022-04-18

## 2022-04-18 PROBLEM — J44.1 COPD WITH ACUTE EXACERBATION: Status: RESOLVED | Noted: 2022-03-03 | Resolved: 2022-04-18

## 2022-04-18 PROCEDURE — 99213 PR OFFICE/OUTPT VISIT, EST, LEVL III, 20-29 MIN: ICD-10-PCS | Mod: ,,, | Performed by: NURSE PRACTITIONER

## 2022-04-18 PROCEDURE — 99213 OFFICE O/P EST LOW 20 MIN: CPT | Mod: ,,, | Performed by: NURSE PRACTITIONER

## 2022-04-18 NOTE — PATIENT INSTRUCTIONS
Take lasix 20mg daily on Monday, tues and wed, then weigh daily and if 3 or more pound wt gain, take lasix.     Return to clinic in 1 week

## 2022-04-18 NOTE — PROGRESS NOTES
Ofe Oliver NP   [facility]  [address]  [place]     PATIENT NAME: Geoffrey Gleason  : 1950  DATE: 22  MRN: [id number]      Billing Provider: Ofe Oliver NP  Level of Service:   Patient PCP Information     Provider PCP Type    Ofe Oliver NP General          Reason for Visit / Chief Complaint: Follow-up, Shortness of Breath, Swelling, and Recurrent Skin Infections (cellulitis)       Update PCP  Update Chief Complaint         History of Present Illness / Problem Focused Workflow     Geoffrey Gleason presents to the clinic here for eval of sob, swelling of lower ext, and cellulitis of lower ext. 02 sat 85% without o2, stated that is how it usually runs during the day with out his o2, denies sob      Review of Systems     Review of Systems   Constitutional: Negative for chills, fatigue and fever.   HENT: Negative for nasal congestion, ear pain, facial swelling, hearing loss, mouth dryness, mouth sores, postnasal drip, rhinorrhea, sinus pressure/congestion and goiter.    Eyes: Negative for discharge and itching.   Respiratory: Negative for cough, shortness of breath and wheezing.    Cardiovascular: Positive for leg swelling. Negative for chest pain.   Gastrointestinal: Negative for abdominal pain, change in bowel habit and change in bowel habit.   Genitourinary: Negative for difficulty urinating, dysuria, enuresis, frequency, hematuria and urgency.   Neurological: Negative for dizziness, vertigo, syncope, weakness and headaches.   Psychiatric/Behavioral: Negative for decreased concentration.        Medical / Social / Family History     Past Medical History:   Diagnosis Date    COPD (chronic obstructive pulmonary disease)     Shortness of breath 3/3/2022       History reviewed. No pertinent surgical history.    Social History    reports that he has been smoking cigarettes. He started smoking about 54 years ago. He has smoked for the past 54.00 years. He has never used smokeless  "tobacco. He reports previous alcohol use. He reports that he does not use drugs.    Family History  Mr.'s family history is not on file.    Medications and Allergies     Medications  Outpatient Medications Marked as Taking for the 4/18/22 encounter (Office Visit) with Ofe Oliver NP   Medication Sig Dispense Refill    albuterol (PROVENTIL/VENTOLIN HFA) 90 mcg/actuation inhaler INHALE TWO PUFFS TWICE DAILY AS NEEDED      albuterol-ipratropium (DUO-NEB) 2.5 mg-0.5 mg/3 mL nebulizer solution Take by nebulization every 6 (six) hours as needed.      DALIRESP 500 mcg Tab Take 1 tablet by mouth once daily.      doxycycline (MONODOX) 100 MG capsule Take 1 capsule (100 mg total) by mouth every 12 (twelve) hours. 20 capsule 0    furosemide (LASIX) 20 MG tablet Take 1 tablet po dialy x 3 then weigh daily and if more than 3 pound wt gain, take 1 tablets 30 tablet 11    gabapentin (NEURONTIN) 300 MG capsule TAKE ONE CAPSULE BY MOUTH AT BEDTIME 90 capsule 0    levalbuterol (XOPENEX HFA) 45 mcg/actuation inhaler INHALE ONE PUFF BY MOUTH EVERY 4 HOURS AS NEEDED      levalbuterol (XOPENEX) 1.25 mg/3 mL nebulizer solution USE ONE vial via NEBULIZER EVERY 8 HOURS AS NEEDED      tamsulosin (FLOMAX) 0.4 mg Cap Take 1 capsule by mouth once daily.         Allergies  Review of patient's allergies indicates:   Allergen Reactions    Cephalexin Hives and Other (See Comments)     Swelling SOB      Amoxicillin Rash       Physical Examination     Vitals:    04/18/22 0855   BP: 118/62   BP Location: Left arm   Patient Position: Sitting   BP Method: Medium (Manual)   Pulse: 91   Resp: 18   Temp: 98.2 °F (36.8 °C)   TempSrc: Oral   SpO2: (!) 85%   Weight: 79.5 kg (175 lb 6 oz)   Height: 5' 9.02" (1.753 m)      Physical Exam  Constitutional:       Appearance: Normal appearance.   HENT:      Head: Normocephalic.      Right Ear: Tympanic membrane, ear canal and external ear normal.      Left Ear: Tympanic membrane, ear canal and " external ear normal.      Nose: Nose normal.      Mouth/Throat:      Mouth: Mucous membranes are moist.      Pharynx: Oropharynx is clear.   Eyes:      Extraocular Movements: Extraocular movements intact.      Conjunctiva/sclera: Conjunctivae normal.      Pupils: Pupils are equal, round, and reactive to light.   Cardiovascular:      Rate and Rhythm: Normal rate and regular rhythm.      Pulses: Normal pulses.      Heart sounds: Normal heart sounds.   Pulmonary:      Effort: Pulmonary effort is normal.      Breath sounds: Normal breath sounds.   Abdominal:      General: Bowel sounds are normal.      Palpations: Abdomen is soft.   Musculoskeletal:         General: No swelling. Normal range of motion.      Right lower leg: Edema present.      Left lower leg: Edema present.      Comments: Nonpitting edema of lower ext.    Skin:     General: Skin is warm and dry.      Capillary Refill: Capillary refill takes less than 2 seconds.      Findings: Erythema (erythema has almost cleared on lower ext. but still with nonpitting edema of lower ext) present.   Neurological:      General: No focal deficit present.      Mental Status: He is alert and oriented to person, place, and time.   Psychiatric:         Mood and Affect: Mood normal.         Behavior: Behavior normal.          Assessment and Plan (including Health Maintenance)      Problem List  Smart Sets  Document Outside HM   :    Plan: elevate legs, meds as ordered, return to clnic as needed and in  1 week.     Cellulitis of left lower extremity    Cellulitis of right lower extremity    Chronic obstructive pulmonary disease, unspecified COPD type    Swelling of both lower extremities            Health Maintenance Due   Topic Date Due    Hepatitis C Screening  Never done    TETANUS VACCINE  Never done    Shingles Vaccine (1 of 2) Never done    Abdominal Aortic Aneurysm Screening  Never done    Lipid Panel  09/27/2021       Problem List Items Addressed This Visit         Pulmonary    Chronic obstructive pulmonary disease       ID    Cellulitis - Primary      Other Visit Diagnoses     Swelling of both lower extremities                Health Maintenance Topics with due status: Not Due       Topic Last Completion Date    Colorectal Cancer Screening 02/05/2016    DEXA Scan 09/21/2020       Procedures     Future Appointments   Date Time Provider Department Center   4/26/2022  4:00 PM Ofe Oliver NP [facility]        Follow up in about 1 week (around 4/25/2022) for f\u.       Signature:  Ofe Oliver NP    Date of encounter: 4/18/22         No

## 2022-05-20 LAB — ABDOMINAL AORTA PROX PSV: NEGATIVE CM/S

## 2022-06-06 PROBLEM — J01.00 ACUTE NON-RECURRENT MAXILLARY SINUSITIS: Status: RESOLVED | Noted: 2022-03-03 | Resolved: 2022-06-06

## 2022-10-09 DIAGNOSIS — Z71.89 COMPLEX CARE COORDINATION: ICD-10-CM

## 2022-11-07 ENCOUNTER — OFFICE VISIT (OUTPATIENT)
Dept: FAMILY MEDICINE | Facility: CLINIC | Age: 72
End: 2022-11-07
Payer: MEDICARE

## 2022-11-07 VITALS
TEMPERATURE: 98 F | WEIGHT: 164 LBS | SYSTOLIC BLOOD PRESSURE: 122 MMHG | HEIGHT: 69 IN | HEART RATE: 88 BPM | DIASTOLIC BLOOD PRESSURE: 68 MMHG | BODY MASS INDEX: 24.29 KG/M2 | OXYGEN SATURATION: 95 % | RESPIRATION RATE: 18 BRPM

## 2022-11-07 DIAGNOSIS — M54.2 NECK PAIN: ICD-10-CM

## 2022-11-07 DIAGNOSIS — J01.00 ACUTE NON-RECURRENT MAXILLARY SINUSITIS: Primary | ICD-10-CM

## 2022-11-07 PROCEDURE — 96372 PR INJECTION,THERAP/PROPH/DIAG2ST, IM OR SUBCUT: ICD-10-PCS | Mod: ,,, | Performed by: NURSE PRACTITIONER

## 2022-11-07 PROCEDURE — 99214 PR OFFICE/OUTPT VISIT, EST, LEVL IV, 30-39 MIN: ICD-10-PCS | Mod: ,,, | Performed by: NURSE PRACTITIONER

## 2022-11-07 PROCEDURE — 99214 OFFICE O/P EST MOD 30 MIN: CPT | Mod: ,,, | Performed by: NURSE PRACTITIONER

## 2022-11-07 PROCEDURE — 96372 THER/PROPH/DIAG INJ SC/IM: CPT | Mod: ,,, | Performed by: NURSE PRACTITIONER

## 2022-11-07 RX ORDER — BUDESONIDE 0.5 MG/2ML
INHALANT ORAL
COMMUNITY

## 2022-11-07 RX ORDER — LINCOMYCIN HYDROCHLORIDE 300 MG/ML
600 INJECTION, SOLUTION INTRAMUSCULAR; INTRAVENOUS; SUBCONJUNCTIVAL
Status: COMPLETED | OUTPATIENT
Start: 2022-11-07 | End: 2022-11-07

## 2022-11-07 RX ORDER — GABAPENTIN 300 MG/1
300 CAPSULE ORAL NIGHTLY
Qty: 90 CAPSULE | Refills: 1 | Status: SHIPPED | OUTPATIENT
Start: 2022-11-07 | End: 2023-02-27

## 2022-11-07 RX ORDER — DEXAMETHASONE SODIUM PHOSPHATE 4 MG/ML
4 INJECTION, SOLUTION INTRA-ARTICULAR; INTRALESIONAL; INTRAMUSCULAR; INTRAVENOUS; SOFT TISSUE
Status: COMPLETED | OUTPATIENT
Start: 2022-11-07 | End: 2022-11-07

## 2022-11-07 RX ORDER — AZITHROMYCIN 250 MG/1
TABLET, FILM COATED ORAL
Qty: 6 TABLET | Refills: 0 | Status: SHIPPED | OUTPATIENT
Start: 2022-11-07 | End: 2022-11-12

## 2022-11-07 RX ORDER — PROMETHAZINE HYDROCHLORIDE AND PHENYLEPHRINE HYDROCHLORIDE 6.25; 5 MG/5ML; MG/5ML
5 SYRUP ORAL EVERY 6 HOURS PRN
Qty: 473 ML | Refills: 0 | Status: SHIPPED | OUTPATIENT
Start: 2022-11-07 | End: 2022-11-17

## 2022-11-07 RX ORDER — KETOCONAZOLE 20 MG/ML
SHAMPOO, SUSPENSION TOPICAL
COMMUNITY
Start: 2022-11-04 | End: 2023-11-28

## 2022-11-07 RX ADMIN — LINCOMYCIN HYDROCHLORIDE 600 MG: 300 INJECTION, SOLUTION INTRAMUSCULAR; INTRAVENOUS; SUBCONJUNCTIVAL at 02:11

## 2022-11-07 RX ADMIN — DEXAMETHASONE SODIUM PHOSPHATE 4 MG: 4 INJECTION, SOLUTION INTRA-ARTICULAR; INTRALESIONAL; INTRAMUSCULAR; INTRAVENOUS; SOFT TISSUE at 02:11

## 2022-11-07 NOTE — PROGRESS NOTES
Ofe Oliver NP   [facility]  [address]  [place]     PATIENT NAME: Geoffrey Gleason  : 1950  DATE: 22  MRN: [id number]      Billing Provider: Ofe Oliver NP  Level of Service:   Patient PCP Information       Provider PCP Type    Ofe Oliver NP General            Reason for Visit / Chief Complaint: Cough and Nasal Congestion (Started Saturday and progressively gotten worse)       Update PCP  Update Chief Complaint         History of Present Illness / Problem Focused Workflow     Geoffrey Gleason presents to the clinic c/o cough, nasal congestion that started on sat, progressively gotten worse, also needs rx on neurontin that he takes for his neck pain      Review of Systems     Review of Systems   Constitutional:  Negative for chills, fatigue and fever.   HENT:  Positive for nasal congestion. Negative for ear pain, facial swelling, hearing loss, mouth dryness, mouth sores, postnasal drip, rhinorrhea, sinus pressure/congestion and goiter.    Eyes:  Negative for discharge and itching.   Respiratory:  Negative for cough, shortness of breath and wheezing.         Chest congestion   Cardiovascular:  Negative for chest pain and leg swelling.   Gastrointestinal:  Negative for abdominal pain, change in bowel habit and change in bowel habit.   Genitourinary:  Negative for difficulty urinating, dysuria, enuresis, frequency, hematuria and urgency.   Neurological:  Negative for dizziness, vertigo, syncope, weakness and headaches.   Psychiatric/Behavioral:  Negative for decreased concentration.    All other systems reviewed and are negative.     Medical / Social / Family History     Past Medical History:   Diagnosis Date    COPD (chronic obstructive pulmonary disease)     Shortness of breath 3/3/2022       History reviewed. No pertinent surgical history.    Social History    reports that he has been smoking cigarettes. He started smoking about 54 years ago. He has never used smokeless  tobacco. He reports that he does not currently use alcohol. He reports that he does not use drugs.    Family History  MrNathalie's family history is not on file.    Medications and Allergies     Medications  Outpatient Medications Marked as Taking for the 11/7/22 encounter (Office Visit) with Ofe Oliver NP   Medication Sig Dispense Refill    albuterol (PROVENTIL/VENTOLIN HFA) 90 mcg/actuation inhaler INHALE TWO PUFFS TWICE DAILY AS NEEDED      albuterol-ipratropium (DUO-NEB) 2.5 mg-0.5 mg/3 mL nebulizer solution Take by nebulization every 6 (six) hours as needed.      budesonide (PULMICORT) 0.5 mg/2 mL nebulizer solution 2 ml      DALIRESP 500 mcg Tab Take 1 tablet by mouth once daily.      ketoconazole (NIZORAL) 2 % shampoo Apply topically.      levalbuterol (XOPENEX HFA) 45 mcg/actuation inhaler INHALE ONE PUFF BY MOUTH EVERY 4 HOURS AS NEEDED      levalbuterol (XOPENEX) 1.25 mg/3 mL nebulizer solution USE ONE vial via NEBULIZER EVERY 8 HOURS AS NEEDED      tamsulosin (FLOMAX) 0.4 mg Cap Take 1 capsule by mouth once daily.      [DISCONTINUED] gabapentin (NEURONTIN) 300 MG capsule TAKE ONE CAPSULE BY MOUTH AT BEDTIME 90 capsule 0     Current Facility-Administered Medications for the 11/7/22 encounter (Office Visit) with Ofe Oliver NP   Medication Dose Route Frequency Provider Last Rate Last Admin    [COMPLETED] dexAMETHasone injection 4 mg  4 mg Intramuscular 1 time in Clinic/HOD Ofe Oliver NP   4 mg at 11/07/22 1440    [COMPLETED] lincomycin injection 600 mg  600 mg Intramuscular 1 time in Clinic/HOD Ofe Oliver NP   600 mg at 11/07/22 1441       Allergies  Review of patient's allergies indicates:   Allergen Reactions    Cephalexin Hives and Other (See Comments)     Swelling SOB      Amoxicillin Rash       Physical Examination     Vitals:    11/07/22 1340   BP: 122/68   BP Location: Left arm   Patient Position: Sitting   Pulse: 88   Resp: 18   Temp: 98.2 °F (36.8 °C)   TempSrc: Oral  "  SpO2: 95%   Weight: 74.4 kg (164 lb)   Height: 5' 9" (1.753 m)      Physical Exam  Constitutional:       Appearance: Normal appearance.   HENT:      Head: Normocephalic.      Right Ear: Tympanic membrane, ear canal and external ear normal.      Left Ear: Tympanic membrane, ear canal and external ear normal.      Nose: Congestion present.      Comments: Mod amt thick yellow nasal secretion, pressure over max region     Mouth/Throat:      Mouth: Mucous membranes are moist.   Eyes:      Extraocular Movements: Extraocular movements intact.      Conjunctiva/sclera: Conjunctivae normal.      Pupils: Pupils are equal, round, and reactive to light.   Neck:      Comments: Bhavin ant cervical nodes  Cardiovascular:      Rate and Rhythm: Normal rate and regular rhythm.      Pulses: Normal pulses.      Heart sounds: Normal heart sounds.   Pulmonary:      Effort: Pulmonary effort is normal.      Breath sounds: Wheezing (mild exp wheeze noted left upper lobe, has been using hhn) present.   Musculoskeletal:         General: Normal range of motion.      Cervical back: Normal range of motion.   Lymphadenopathy:      Cervical: Cervical adenopathy present.   Skin:     General: Skin is warm and dry.   Neurological:      General: No focal deficit present.      Mental Status: He is alert and oriented to person, place, and time.   Psychiatric:         Behavior: Behavior normal.        Assessment and Plan (including Health Maintenance)      Problem List  Smart Sets  Document Outside HM   :    Plan: meds as ordered, return  to clinic as needed. Keep all scheduled bety.     Neck pain  -     gabapentin (NEURONTIN) 300 MG capsule; Take 1 capsule (300 mg total) by mouth every evening.  Dispense: 90 capsule; Refill: 1    Acute non-recurrent maxillary sinusitis    Other orders  -     promethazine-phenylephrine (PROMETHAZINE VC) 6.25-5 mg/5 mL syrup; Take 5 mLs by mouth every 6 (six) hours as needed for Allergies, Rhinitis or Congestion (cough).  " Dispense: 473 mL; Refill: 0  -     lincomycin injection 600 mg  -     dexAMETHasone injection 4 mg  -     azithromycin (Z-MARCIA) 250 MG tablet; Take 2 tablets by mouth on day 1; Take 1 tablet by mouth on days 2-5  Dispense: 6 tablet; Refill: 0            Health Maintenance Due   Topic Date Due    Hepatitis C Screening  Never done    TETANUS VACCINE  Never done    Shingles Vaccine (1 of 2) Never done    Abdominal Aortic Aneurysm Screening  Never done    Lipid Panel  09/27/2021    COVID-19 Vaccine (4 - Booster for Pfizer series) 12/10/2021    Influenza Vaccine (1) 09/01/2022       Problem List Items Addressed This Visit    None  Visit Diagnoses       Neck pain    -  Primary    Relevant Medications    gabapentin (NEURONTIN) 300 MG capsule    Acute non-recurrent maxillary sinusitis                  Health Maintenance Topics with due status: Not Due       Topic Last Completion Date    Colorectal Cancer Screening 02/05/2016    DEXA Scan 09/21/2020       Procedures     Future Appointments   Date Time Provider Department Center   11/7/2022  2:45 PM Ofe Oliver NP [facility]        No follow-ups on file.       Signature:  Ofe Oliver NP    Date of encounter: 11/7/22

## 2023-02-06 PROBLEM — J01.00 ACUTE NON-RECURRENT MAXILLARY SINUSITIS: Status: RESOLVED | Noted: 2022-11-07 | Resolved: 2023-02-06

## 2023-05-09 DIAGNOSIS — Z71.89 COMPLEX CARE COORDINATION: ICD-10-CM

## 2023-06-30 ENCOUNTER — EXTERNAL CHRONIC CARE MANAGEMENT (OUTPATIENT)
Dept: FAMILY MEDICINE | Facility: CLINIC | Age: 73
End: 2023-06-30
Payer: MEDICARE

## 2023-06-30 PROCEDURE — G0511 CCM/BHI BY RHC/FQHC 20MIN MO: HCPCS | Mod: ,,, | Performed by: NURSE PRACTITIONER

## 2023-06-30 PROCEDURE — G0511 PR CHRONIC CARE MGMT, RHC OR FQHC ONLY, 20 MINS OR MORE: ICD-10-PCS | Mod: ,,, | Performed by: NURSE PRACTITIONER

## 2023-08-29 DIAGNOSIS — M54.2 NECK PAIN: ICD-10-CM

## 2023-08-29 RX ORDER — GABAPENTIN 300 MG/1
CAPSULE ORAL
Qty: 90 CAPSULE | Refills: 1 | Status: SHIPPED | OUTPATIENT
Start: 2023-08-29 | End: 2023-11-28 | Stop reason: SDUPTHER

## 2023-09-07 ENCOUNTER — PATIENT OUTREACH (OUTPATIENT)
Dept: ADMINISTRATIVE | Facility: HOSPITAL | Age: 73
End: 2023-09-07

## 2023-09-07 NOTE — PROGRESS NOTES
Pt on the [facility] NOT SEEN IN 2023 REPORT FOR Ofe Oliver NP.          Pt due for Annual Wellness Visit.    LOV: 11.7.22

## 2023-11-28 ENCOUNTER — OFFICE VISIT (OUTPATIENT)
Dept: FAMILY MEDICINE | Facility: CLINIC | Age: 73
End: 2023-11-28
Payer: MEDICARE

## 2023-11-28 ENCOUNTER — PATIENT OUTREACH (OUTPATIENT)
Dept: ADMINISTRATIVE | Facility: HOSPITAL | Age: 73
End: 2023-11-28

## 2023-11-28 VITALS
BODY MASS INDEX: 24.05 KG/M2 | OXYGEN SATURATION: 88 % | HEIGHT: 69 IN | TEMPERATURE: 98 F | DIASTOLIC BLOOD PRESSURE: 71 MMHG | RESPIRATION RATE: 22 BRPM | SYSTOLIC BLOOD PRESSURE: 115 MMHG | WEIGHT: 162.38 LBS | HEART RATE: 72 BPM

## 2023-11-28 DIAGNOSIS — J44.1 COPD WITH ACUTE EXACERBATION: Primary | ICD-10-CM

## 2023-11-28 DIAGNOSIS — M54.2 NECK PAIN: ICD-10-CM

## 2023-11-28 DIAGNOSIS — R05.3 CHRONIC COUGH: ICD-10-CM

## 2023-11-28 PROBLEM — M25.471 SWELLING OF BOTH ANKLES: Status: RESOLVED | Noted: 2022-04-14 | Resolved: 2023-11-28

## 2023-11-28 PROBLEM — L03.90 CELLULITIS: Status: RESOLVED | Noted: 2022-04-14 | Resolved: 2023-11-28

## 2023-11-28 PROBLEM — M25.472 SWELLING OF BOTH ANKLES: Status: RESOLVED | Noted: 2022-04-14 | Resolved: 2023-11-28

## 2023-11-28 PROBLEM — M79.89 SWELLING OF BOTH LOWER EXTREMITIES: Status: RESOLVED | Noted: 2022-04-18 | Resolved: 2023-11-28

## 2023-11-28 PROCEDURE — 99213 OFFICE O/P EST LOW 20 MIN: CPT | Mod: ,,, | Performed by: NURSE PRACTITIONER

## 2023-11-28 PROCEDURE — 99213 PR OFFICE/OUTPT VISIT, EST, LEVL III, 20-29 MIN: ICD-10-PCS | Mod: ,,, | Performed by: NURSE PRACTITIONER

## 2023-11-28 PROCEDURE — 96372 PR INJECTION,THERAP/PROPH/DIAG2ST, IM OR SUBCUT: ICD-10-PCS | Mod: ,,, | Performed by: NURSE PRACTITIONER

## 2023-11-28 PROCEDURE — 96372 THER/PROPH/DIAG INJ SC/IM: CPT | Mod: ,,, | Performed by: NURSE PRACTITIONER

## 2023-11-28 RX ORDER — AZITHROMYCIN 250 MG/1
TABLET, FILM COATED ORAL
Qty: 6 EACH | Refills: 0 | Status: SHIPPED | OUTPATIENT
Start: 2023-11-28 | End: 2023-12-03

## 2023-11-28 RX ORDER — LINCOMYCIN HYDROCHLORIDE 300 MG/ML
600 INJECTION, SOLUTION INTRAMUSCULAR; INTRAVENOUS; SUBCONJUNCTIVAL
Status: COMPLETED | OUTPATIENT
Start: 2023-11-28 | End: 2023-11-28

## 2023-11-28 RX ORDER — METHOTREXATE 2.5 MG/1
TABLET ORAL
COMMUNITY
End: 2023-11-28

## 2023-11-28 RX ORDER — DEXAMETHASONE SODIUM PHOSPHATE 4 MG/ML
4 INJECTION, SOLUTION INTRA-ARTICULAR; INTRALESIONAL; INTRAMUSCULAR; INTRAVENOUS; SOFT TISSUE
Status: COMPLETED | OUTPATIENT
Start: 2023-11-28 | End: 2023-11-28

## 2023-11-28 RX ORDER — GABAPENTIN 300 MG/1
300 CAPSULE ORAL NIGHTLY
Qty: 90 CAPSULE | Refills: 1 | Status: SHIPPED | OUTPATIENT
Start: 2023-11-28 | End: 2024-02-09 | Stop reason: SDUPTHER

## 2023-11-28 RX ORDER — PREDNISONE 10 MG/1
TABLET ORAL
COMMUNITY
End: 2023-11-28

## 2023-11-28 RX ORDER — PREDNISOLONE ACETATE 10 MG/ML
SUSPENSION/ DROPS OPHTHALMIC
COMMUNITY
End: 2023-11-28

## 2023-11-28 RX ORDER — BENZONATATE 100 MG/1
CAPSULE ORAL
COMMUNITY
End: 2023-11-28

## 2023-11-28 RX ORDER — BUDESONIDE AND FORMOTEROL FUMARATE DIHYDRATE 160; 4.5 UG/1; UG/1
AEROSOL RESPIRATORY (INHALATION)
COMMUNITY
End: 2023-11-28

## 2023-11-28 RX ORDER — CLOBETASOL PROPIONATE 0.46 MG/ML
SOLUTION TOPICAL
COMMUNITY
End: 2023-11-28

## 2023-11-28 RX ORDER — FLUTICASONE PROPIONATE 50 MCG
SPRAY, SUSPENSION (ML) NASAL
COMMUNITY
End: 2023-11-28

## 2023-11-28 RX ORDER — IPRATROPIUM BROMIDE 42 UG/1
SPRAY, METERED NASAL
COMMUNITY

## 2023-11-28 RX ORDER — DOXYCYCLINE 100 MG/1
1 TABLET ORAL DAILY
COMMUNITY
End: 2023-11-28

## 2023-11-28 RX ORDER — PROMETHAZINE HYDROCHLORIDE AND DEXTROMETHORPHAN HYDROBROMIDE 6.25; 15 MG/5ML; MG/5ML
5 SYRUP ORAL EVERY 6 HOURS PRN
Qty: 240 ML | Refills: 0 | Status: SHIPPED | OUTPATIENT
Start: 2023-11-28

## 2023-11-28 RX ORDER — GABAPENTIN 100 MG/1
1 CAPSULE ORAL NIGHTLY
COMMUNITY
End: 2023-11-28

## 2023-11-28 RX ORDER — METHYLPREDNISOLONE 4 MG/1
TABLET ORAL
Qty: 21 EACH | Refills: 0 | Status: SHIPPED | OUTPATIENT
Start: 2023-11-28 | End: 2023-12-19

## 2023-11-28 RX ADMIN — LINCOMYCIN HYDROCHLORIDE 600 MG: 300 INJECTION, SOLUTION INTRAMUSCULAR; INTRAVENOUS; SUBCONJUNCTIVAL at 11:11

## 2023-11-28 RX ADMIN — DEXAMETHASONE SODIUM PHOSPHATE 4 MG: 4 INJECTION, SOLUTION INTRA-ARTICULAR; INTRALESIONAL; INTRAMUSCULAR; INTRAVENOUS; SOFT TISSUE at 11:11

## 2023-11-28 NOTE — ASSESSMENT & PLAN NOTE
Lincomycin 600mg/Decadron 4mg injections given. No adverse reaction noted.   Medrol Dose Armando prescribed to take as directed. Instructed to start tomorrow.  Zpak prescribed to take as directed. Instructed to take all abx until gone even if start to feel better.    Pt has f/u with Dr. Chen tomorrow and states he will sometimes get cxr there.   Instructed to RTC for any new/worsening/persisting ssx.

## 2023-11-28 NOTE — PROGRESS NOTES
LELAND Elizabeth   [facility]  [address]  [place] [address]     PATIENT NAME: Geoffrey Gleason  : 1950  DATE: 23  MRN: [id number]      Billing Provider: LELAND Elizabeth  Level of Service:   Patient PCP Information       Provider PCP Type    LELAND Elizabeth General            Reason for Visit / Chief Complaint: Cough and Shortness of Breath   Health Maintenance Due   Topic Date Due    Hepatitis C Screening  Never done    Shingles Vaccine (1 of 2) Never done    RSV Vaccine (Age 60+ and Pregnant patients) (1 - 1-dose 60+ series) Never done    Abdominal Aortic Aneurysm Screening  Never done    DEXA Scan  2023          History of Present Illness / Problem Focused Workflow     Geoffrey Gleason presents to the clinic with cough and sob. He denies any other symptoms such as fever, sore throat, bodyaches, headaches, earaches, n/v/d. He sees Dr. Chen every 6 months and has routine f/u with him tomorrow. O2 sat is 88% today and states that is actually good for him without his oxygen. He states he does use oxygen at night to help him sleep and does not have to use it during the day although sometimes he should. He states he usually has to get a steroid shot and steroid pack when his COPD flares up. He is also requesting refills on his Gabapentin and Promethazine DM cough syrup. He states he is taking his nebulizer/inhalers as directed. He has list of medications with him today where our med list has some medications pt is no longer taking. He has not had labs drawn here but states he has had at Dr. Chen's office. Will get records from his office. He denies any other needs at this time. NAD noted.      Wt Readings from Last 3 Encounters:   23 1105 73.7 kg (162 lb 6.4 oz)   22 1340 74.4 kg (164 lb)   22 0855 79.5 kg (175 lb 6 oz)        BP Readings from Last 3 Encounters:   23 115/71   22 122/68   22 118/62        Review of Systems     Review of  Systems   Constitutional: Negative.    HENT: Negative.     Eyes: Negative.    Respiratory:  Positive for cough and shortness of breath. Negative for apnea, chest tightness and wheezing.    Cardiovascular: Negative.    Gastrointestinal: Negative.    Endocrine: Negative.    Genitourinary: Negative.    Musculoskeletal: Negative.    Integumentary:  Negative.   Allergic/Immunologic: Negative.    Neurological: Negative.    Hematological: Negative.    Psychiatric/Behavioral: Negative.          Medical / Social / Family History     Past Medical History:   Diagnosis Date    COPD (chronic obstructive pulmonary disease)     Shortness of breath 3/3/2022       History reviewed. No pertinent surgical history.    Social History    reports that he has been smoking cigarettes. He started smoking about 55 years ago. He has never used smokeless tobacco. He reports that he does not currently use alcohol. He reports that he does not use drugs.    Family History  's family history is not on file.    Medications and Allergies     Medications  Outpatient Medications Marked as Taking for the 11/28/23 encounter (Office Visit) with Moon Shields FNP   Medication Sig Dispense Refill    budesonide (PULMICORT) 0.5 mg/2 mL nebulizer solution 2 ml      DALIRESP 500 mcg Tab Take 1 tablet by mouth once daily.      levalbuterol (XOPENEX) 1.25 mg/3 mL nebulizer solution USE ONE vial via NEBULIZER EVERY 8 HOURS AS NEEDED      tamsulosin (FLOMAX) 0.4 mg Cap Take 1 capsule by mouth once daily.      umeclidinium (INCRUSE ELLIPTA) 62.5 mcg/actuation inhalation capsule INHALE ONE PUFF ONCE DAILY      [DISCONTINUED] albuterol-ipratropium (DUO-NEB) 2.5 mg-0.5 mg/3 mL nebulizer solution Take by nebulization every 6 (six) hours as needed.      [DISCONTINUED] budesonide-formoterol 160-4.5 mcg (SYMBICORT) 160-4.5 mcg/actuation HFAA INHALE TWO PUFFS BY MOUTH TWICE DAILY WHEN AWAY FROM HOME      [DISCONTINUED] clobetasoL (TEMOVATE) 0.05 % external solution  "APPLY TO THE AFFECTED AREA(S) ON SCALP TWICE DAILY      [DISCONTINUED] gabapentin (NEURONTIN) 300 MG capsule TAKE ONE CAPSULE BY MOUTH EVERY EVENING 90 capsule 1    [DISCONTINUED] levalbuterol (XOPENEX HFA) 45 mcg/actuation inhaler INHALE ONE PUFF BY MOUTH EVERY 4 HOURS AS NEEDED       Current Facility-Administered Medications for the 11/28/23 encounter (Office Visit) with Moon Shields FNP   Medication Dose Route Frequency Provider Last Rate Last Admin    [COMPLETED] dexAMETHasone injection 4 mg  4 mg Intramuscular 1 time in Clinic/HOD Moon Shields FNP   4 mg at 11/28/23 1138    [COMPLETED] lincomycin injection 600 mg  600 mg Intramuscular 1 time in Clinic/HOD Moon Shields FNP   600 mg at 11/28/23 1138       Allergies  Review of patient's allergies indicates:   Allergen Reactions    Cephalexin Hives and Other (See Comments)     Swelling SOB      Amoxicillin Rash       Physical Examination     Vitals:    11/28/23 1105   BP: 115/71   BP Location: Left arm   Patient Position: Sitting   BP Method: Medium (Automatic)   Pulse: 72   Resp: (!) 22   Temp: 98.1 °F (36.7 °C)   TempSrc: Oral   SpO2: (!) 88%   Weight: 73.7 kg (162 lb 6.4 oz)   Height: 5' 9" (1.753 m)      Physical Exam  Constitutional:       Appearance: Normal appearance.   HENT:      Head: Normocephalic.      Right Ear: Hearing, ear canal and external ear normal. A middle ear effusion is present.      Left Ear: Hearing, ear canal and external ear normal. A middle ear effusion is present.      Nose: Nose normal.      Mouth/Throat:      Mouth: Mucous membranes are moist.      Pharynx: Oropharynx is clear.   Eyes:      Extraocular Movements: Extraocular movements intact.      Pupils: Pupils are equal, round, and reactive to light.   Cardiovascular:      Rate and Rhythm: Normal rate and regular rhythm.      Pulses: Normal pulses.      Heart sounds: Normal heart sounds.   Pulmonary:      Effort: Pulmonary effort is normal.      Breath sounds: Examination of the " right-lower field reveals rhonchi. Examination of the left-lower field reveals rhonchi. Rhonchi present.   Musculoskeletal:         General: Normal range of motion.      Cervical back: Normal range of motion.   Skin:     General: Skin is warm and dry.      Capillary Refill: Capillary refill takes less than 2 seconds.   Neurological:      General: No focal deficit present.      Mental Status: He is alert and oriented to person, place, and time.   Psychiatric:         Mood and Affect: Mood normal.         Behavior: Behavior normal.          Assessment and Plan (including Health Maintenance)   :    Plan:     Patient Instructions   Please let us know by Friday if not any better, pt to see Dr. Chen tomorrow for routine check up.        Health Maintenance Due   Topic Date Due    Hepatitis C Screening  Never done    Shingles Vaccine (1 of 2) Never done    RSV Vaccine (Age 60+ and Pregnant patients) (1 - 1-dose 60+ series) Never done    Abdominal Aortic Aneurysm Screening  Never done    DEXA Scan  09/21/2023       Problem List Items Addressed This Visit       Chronic cough    Current Assessment & Plan     Promethazine DM refilled.   Instructed to RTC for any new/worsening/persisting ssx.           Relevant Medications    promethazine-dextromethorphan (PROMETHAZINE-DM) 6.25-15 mg/5 mL Syrp    COPD with acute exacerbation - Primary    Current Assessment & Plan     Lincomycin 600mg/Decadron 4mg injections given. No adverse reaction noted.   Medrol Dose Marcia prescribed to take as directed. Instructed to start tomorrow.  Zpak prescribed to take as directed. Instructed to take all abx until gone even if start to feel better.    Pt has f/u with Dr. Chen tomorrow and states he will sometimes get cxr there.   Instructed to RTC for any new/worsening/persisting ssx.           Relevant Medications    dexAMETHasone injection 4 mg (Completed)    azithromycin (Z-MARCIA) 250 MG tablet    lincomycin injection 600 mg (Completed)     methylPREDNISolone (MEDROL DOSEPACK) 4 mg tablet    Neck pain    Current Assessment & Plan      reviewed with no aberrant behavior noted.   Gabapentin refilled.          Relevant Medications    gabapentin (NEURONTIN) 300 MG capsule     COPD with acute exacerbation  -     dexAMETHasone injection 4 mg  -     azithromycin (Z-MARCIA) 250 MG tablet; Take 2 tablets by mouth on day 1; Take 1 tablet by mouth on days 2-5  Dispense: 6 each; Refill: 0  -     lincomycin injection 600 mg  -     methylPREDNISolone (MEDROL DOSEPACK) 4 mg tablet; use as directed  Dispense: 21 each; Refill: 0    Chronic cough  -     promethazine-dextromethorphan (PROMETHAZINE-DM) 6.25-15 mg/5 mL Syrp; Take 5 mLs by mouth every 6 (six) hours as needed (as needed).  Dispense: 240 mL; Refill: 0    Neck pain  -     gabapentin (NEURONTIN) 300 MG capsule; Take 1 capsule (300 mg total) by mouth every evening.  Dispense: 90 capsule; Refill: 1       Health Maintenance Topics with due status: Not Due       Topic Last Completion Date    Colorectal Cancer Screening 02/05/2016    TETANUS VACCINE 02/15/2020    Lipid Panel 05/20/2022         No future appointments.     Follow up if symptoms worsen or fail to improve.    Health Maintenance Due   Topic Date Due    Hepatitis C Screening  Never done    Shingles Vaccine (1 of 2) Never done    RSV Vaccine (Age 60+ and Pregnant patients) (1 - 1-dose 60+ series) Never done    Abdominal Aortic Aneurysm Screening  Never done    DEXA Scan  09/21/2023        Signature:  LELAND Elizabeth    Date of encounter: 11/28/23

## 2023-11-28 NOTE — LETTER
AUTHORIZATION FOR RELEASE OF   CONFIDENTIAL INFORMATION    Dear Dr.Richmond Chen,    We are seeing Geoffrey Gleason, date of birth 1950, in the clinic at [facility]. Moon Shields FNP is the patient's PCP. Geoffrey Gleason has an outstanding lab/procedure at the time we reviewed his chart. In order to help keep his health information updated, he has authorized us to request the following medical record(s):        (  )  MAMMOGRAM                                      (  )  COLONOSCOPY      (  )  PAP SMEAR                                          (  x)  OUTSIDE LAB RESULTS     (  )  DEXA SCAN                                          (  )  EYE EXAM            (  )  FOOT EXAM                                          (  )  ENTIRE RECORD     (  x)  OUTSIDE IMMUNIZATIONS                 ( x )  Most recent office visits         Please fax records to [facility], Cain, Sierra, FNP, [phone]     If you have any questions, please contact Adwoa Patricio at [phone].           Patient Name: Geoffrey Gleason  : 1950  Patient Phone #: [phone]

## 2023-11-28 NOTE — Clinical Note
Can you please obtain records from Pulmonologist Dr. Gustavo Gonzalez? Pt says he has had labs drawn there. Thanks!

## 2023-11-28 NOTE — PROGRESS NOTES
Health maintenance record review for population health care gaps    Population Health Chart Review & Patient Outreach Details      Further Action Needed If Patient Returns Outreach:            Updates Requested / Reviewed:     [x]  Care Everywhere    [x]     []  External Sources ([facility], [facility], DIS, etc.)    [] [facility]   [] [facility]   [] Other:    [x]  Care Team Updated   []  Removed  or Duplicate Orders   []  Immunization Reconciliation Completed / Queried    [] [place]   [] [place]   [] [place]   [] [place]      Health Maintenance Topics Addressed and Outreach Outcomes / Actions Taken:             Breast Cancer Screening []  Mammogram Order Placed    []  Mammogram Screening Scheduled    []  External Records Requested & Care Team Updated if Applicable    []  External Records Uploaded & Care Team Updated if Applicable    []  Pt Declined Scheduling Mammogram    []  Pt Will Schedule with External Provider / Order Routed & Care Team Updated if Applicable              Cervical Cancer Screening []  Pap Smear Scheduled in Primary Care or OBGYN    []  External Records Requested & Care Team Updated if Applicable       []  External Records Uploaded, Care Team Updated, & History Updated if Applicable    []  Patient Declined Scheduling Pap Smear    []  Patient Will Schedule with External Provider & Care Team Updated if Applicable                  Colorectal Cancer Screening []  Colonoscopy Case Request / Referral / Home Test Order Placed    []  External Records Requested & Care Team Updated if Applicable    []  External Records Uploaded, Care Team Updated, & History Updated if Applicable    []  Patient Declined Completing Colon Cancer Screening    []  Patient Will Schedule with External Provider & Care Team Updated if Applicable    []  Fit Kit Mailed (add the SmartPhrase under additional notes)    []  Reminded Patient to Complete Home Test                Diabetic Eye Exam []  Eye Exam Screening Order  Placed    []  Eye Camera Scheduled or Optometry/Ophthalmology Referral Placed    []  External Records Requested & Care Team Updated if Applicable    []  External Records Uploaded, Care Team Updated, & History Updated if Applicable    []  Patient Declined Scheduling Eye Exam    []  Patient Will Schedule with External Provider & Care Team Updated if Applicable             Blood Pressure Control []  Primary Care Follow Up Visit Scheduled     []  Remote Blood Pressure Reading Captured    []  Patient Declined Remote Reading or Scheduling Appt - Escalated to PCP    []  Patient Will Call Back or Send Portal Message with Reading                 HbA1c & Other Labs []  Overdue Lab(s) Ordered    []  Overdue Lab(s) Scheduled    []  External Records Uploaded & Care Team Updated if Applicable    []  Primary Care Follow Up Visit Scheduled     []  Reminded Patient to Complete A1c Home Test    []  Patient Declined Scheduling Labs or Will Call Back to Schedule    []  Patient Will Schedule with External Provider / Order Routed, & Care Team Updated if Applicable           Primary Care Appointment []  Primary Care Appt Scheduled    []  Patient Declined Scheduling or Will Call Back to Schedule    []  Pt Established with External Provider, Updated Care Team, & Informed Pt to Notify Payor if Applicable           Medication Adherence /    Statin Use []  Primary Care Appointment Scheduled    []  Patient Reminded to  Prescription    []  Patient Declined, Provider Notified if Needed    []  Sent Provider Message to Review to Evaluate Pt for Statin, Add Exclusion Dx Codes, Document   Exclusion in Problem List, Change Statin Intensity Level to Moderate or High Intensity if Applicable                Osteoporosis Screening []  Dexa Order Placed    []  Dexa Appointment Scheduled    []  External Records Requested & Care Team Updated    []  External Records Uploaded, Care Team Updated, & History Updated if Applicable    []  Patient Declined  Scheduling Dexa or Will Call Back to Schedule    []  Patient Will Schedule with External Provider / Order Routed & Care Team Updated if Applicable       Additional Notes:       REGINALDO sent to Dr.Richmond Chen Office for recent clinic notes and labs

## 2023-11-28 NOTE — PATIENT INSTRUCTIONS
Please let us know by Friday if not any better, pt to see Dr. Chen tomorrow for routine check up.

## 2023-11-30 ENCOUNTER — PATIENT OUTREACH (OUTPATIENT)
Dept: ADMINISTRATIVE | Facility: HOSPITAL | Age: 73
End: 2023-11-30

## 2023-11-30 NOTE — PROGRESS NOTES
Health maintenance record review for population health care gaps    Upload patient recent visit to [facility] on 5/31/23 [employer]

## 2024-01-09 DIAGNOSIS — Z71.89 COMPLEX CARE COORDINATION: ICD-10-CM

## 2024-02-09 ENCOUNTER — OFFICE VISIT (OUTPATIENT)
Dept: FAMILY MEDICINE | Facility: CLINIC | Age: 74
End: 2024-02-09
Payer: MEDICARE

## 2024-02-09 VITALS
TEMPERATURE: 98 F | DIASTOLIC BLOOD PRESSURE: 67 MMHG | OXYGEN SATURATION: 90 % | WEIGHT: 168.63 LBS | BODY MASS INDEX: 24.9 KG/M2 | HEART RATE: 95 BPM | RESPIRATION RATE: 21 BRPM | SYSTOLIC BLOOD PRESSURE: 116 MMHG

## 2024-02-09 DIAGNOSIS — R05.3 CHRONIC COUGH: ICD-10-CM

## 2024-02-09 DIAGNOSIS — M54.2 NECK PAIN: ICD-10-CM

## 2024-02-09 DIAGNOSIS — J44.1 COPD EXACERBATION: Primary | ICD-10-CM

## 2024-02-09 DIAGNOSIS — R06.02 SOB (SHORTNESS OF BREATH): ICD-10-CM

## 2024-02-09 LAB
CTP QC/QA: YES
FLUAV AG NPH QL: NEGATIVE
FLUBV AG NPH QL: NEGATIVE
S PYO RRNA THROAT QL PROBE: NEGATIVE
SARS-COV-2 AG RESP QL IA.RAPID: NEGATIVE

## 2024-02-09 PROCEDURE — 87804 INFLUENZA ASSAY W/OPTIC: CPT | Mod: 59,RHCUB | Performed by: NURSE PRACTITIONER

## 2024-02-09 PROCEDURE — 87880 STREP A ASSAY W/OPTIC: CPT | Mod: RHCUB | Performed by: NURSE PRACTITIONER

## 2024-02-09 PROCEDURE — 87426 SARSCOV CORONAVIRUS AG IA: CPT | Mod: RHCUB | Performed by: NURSE PRACTITIONER

## 2024-02-09 PROCEDURE — 99214 OFFICE O/P EST MOD 30 MIN: CPT | Mod: ,,, | Performed by: NURSE PRACTITIONER

## 2024-02-09 PROCEDURE — 96372 THER/PROPH/DIAG INJ SC/IM: CPT | Mod: ,,, | Performed by: NURSE PRACTITIONER

## 2024-02-09 RX ORDER — DEXAMETHASONE SODIUM PHOSPHATE 4 MG/ML
4 INJECTION, SOLUTION INTRA-ARTICULAR; INTRALESIONAL; INTRAMUSCULAR; INTRAVENOUS; SOFT TISSUE
Status: COMPLETED | OUTPATIENT
Start: 2024-02-09 | End: 2024-02-09

## 2024-02-09 RX ORDER — ERYTHROMYCIN 5 MG/G
OINTMENT OPHTHALMIC
COMMUNITY
Start: 2024-01-09

## 2024-02-09 RX ORDER — LEVALBUTEROL TARTRATE 45 UG/1
AEROSOL, METERED ORAL
COMMUNITY
Start: 2024-01-22

## 2024-02-09 RX ORDER — LINCOMYCIN HYDROCHLORIDE 300 MG/ML
600 INJECTION, SOLUTION INTRAMUSCULAR; INTRAVENOUS; SUBCONJUNCTIVAL
Status: COMPLETED | OUTPATIENT
Start: 2024-02-09 | End: 2024-02-09

## 2024-02-09 RX ORDER — GABAPENTIN 300 MG/1
300 CAPSULE ORAL NIGHTLY
Qty: 90 CAPSULE | Refills: 1 | Status: SHIPPED | OUTPATIENT
Start: 2024-02-09

## 2024-02-09 RX ORDER — METHYLPREDNISOLONE 4 MG/1
TABLET ORAL
Qty: 21 EACH | Refills: 0 | Status: SHIPPED | OUTPATIENT
Start: 2024-02-09 | End: 2024-03-01

## 2024-02-09 RX ORDER — AZITHROMYCIN 250 MG/1
TABLET, FILM COATED ORAL
Qty: 6 TABLET | Refills: 0 | Status: SHIPPED | OUTPATIENT
Start: 2024-02-09 | End: 2024-02-14

## 2024-02-09 RX ORDER — RESPIRATORY SYNCYTIAL VISUS VACCINE RECOMBINANT, ADJUVANTED 120MCG/0.5
KIT INTRAMUSCULAR
COMMUNITY
Start: 2023-12-11

## 2024-02-09 RX ADMIN — DEXAMETHASONE SODIUM PHOSPHATE 4 MG: 4 INJECTION, SOLUTION INTRA-ARTICULAR; INTRALESIONAL; INTRAMUSCULAR; INTRAVENOUS; SOFT TISSUE at 10:02

## 2024-02-09 RX ADMIN — LINCOMYCIN HYDROCHLORIDE 600 MG: 300 INJECTION, SOLUTION INTRAMUSCULAR; INTRAVENOUS; SUBCONJUNCTIVAL at 10:02

## 2024-02-09 NOTE — ASSESSMENT & PLAN NOTE
Neck pain  is controlled. Current medical regimen is effective;   Will adjust according to results and monitor.

## 2024-02-09 NOTE — ASSESSMENT & PLAN NOTE
Flu, Strep, COVID negative.   Lincomycin 600mg/Decadron 4mg injections given. No adverse reaction noted   Zpak prescribed to take as directed. Instructed to take all abx until gone even if start to feel better.    Medrol Dose Armando prescribed to take as directed.   Continue current medication regimen at home.     Instructed to apply O2 as needed at home even though he may be only ordered to wear this at night. He will monitor O2 and if is below 88% and sob he will apply O2.   Instructed if any difficulty breathing, worsening sob to go to ER. Pt vu.     Rapid Strep A Screen   Date Value Ref Range Status   02/09/2024 Negative Negative Final     Rapid Influenza A Ag   Date Value Ref Range Status   02/09/2024 Negative Negative Final     Rapid Influenza B Ag   Date Value Ref Range Status   02/09/2024 Negative Negative Final     SARS Coronavirus 2 Antigen   Date Value Ref Range Status   02/09/2024 Negative Negative Final

## 2024-03-07 ENCOUNTER — LAB REQUISITION (OUTPATIENT)
Dept: LAB | Facility: HOSPITAL | Age: 74
End: 2024-03-07
Attending: OPHTHALMOLOGY
Payer: MEDICARE

## 2024-03-07 DIAGNOSIS — H11.9 UNSPECIFIED DISORDER OF CONJUNCTIVA: ICD-10-CM

## 2024-03-07 PROCEDURE — 88305 TISSUE EXAM BY PATHOLOGIST: CPT | Mod: 26,,, | Performed by: PATHOLOGY

## 2024-03-07 PROCEDURE — 88305 TISSUE EXAM BY PATHOLOGIST: CPT | Mod: TC,SUR | Performed by: OPHTHALMOLOGY

## 2024-03-08 LAB
DHEA SERPL-MCNC: NORMAL
ESTROGEN SERPL-MCNC: NORMAL PG/ML
INSULIN SERPL-ACNC: NORMAL U[IU]/ML
LAB AP GROSS DESCRIPTION: NORMAL
LAB AP LABORATORY NOTES: NORMAL
T3RU NFR SERPL: NORMAL %

## 2024-04-01 ENCOUNTER — OFFICE VISIT (OUTPATIENT)
Dept: FAMILY MEDICINE | Facility: CLINIC | Age: 74
End: 2024-04-01
Payer: MEDICARE

## 2024-04-01 ENCOUNTER — APPOINTMENT (OUTPATIENT)
Dept: RADIOLOGY | Facility: CLINIC | Age: 74
End: 2024-04-01
Attending: NURSE PRACTITIONER
Payer: MEDICARE

## 2024-04-01 VITALS
HEIGHT: 69 IN | RESPIRATION RATE: 24 BRPM | SYSTOLIC BLOOD PRESSURE: 138 MMHG | WEIGHT: 159.19 LBS | TEMPERATURE: 100 F | DIASTOLIC BLOOD PRESSURE: 77 MMHG | HEART RATE: 119 BPM | OXYGEN SATURATION: 81 % | BODY MASS INDEX: 23.58 KG/M2

## 2024-04-01 DIAGNOSIS — R06.02 SOB (SHORTNESS OF BREATH): ICD-10-CM

## 2024-04-01 DIAGNOSIS — J44.1 COPD EXACERBATION: Primary | ICD-10-CM

## 2024-04-01 DIAGNOSIS — H65.192 OTHER NON-RECURRENT ACUTE NONSUPPURATIVE OTITIS MEDIA OF LEFT EAR: ICD-10-CM

## 2024-04-01 PROBLEM — H66.92 LEFT OTITIS MEDIA: Status: ACTIVE | Noted: 2024-04-01

## 2024-04-01 PROCEDURE — 71046 X-RAY EXAM CHEST 2 VIEWS: CPT | Mod: TC,RHCUB | Performed by: NURSE PRACTITIONER

## 2024-04-01 PROCEDURE — 71046 X-RAY EXAM CHEST 2 VIEWS: CPT | Mod: 26,,, | Performed by: RADIOLOGY

## 2024-04-01 PROCEDURE — 99213 OFFICE O/P EST LOW 20 MIN: CPT | Mod: ,,, | Performed by: NURSE PRACTITIONER

## 2024-04-01 PROCEDURE — 96372 THER/PROPH/DIAG INJ SC/IM: CPT | Mod: ,,, | Performed by: NURSE PRACTITIONER

## 2024-04-01 RX ORDER — LINCOMYCIN HYDROCHLORIDE 300 MG/ML
600 INJECTION, SOLUTION INTRAMUSCULAR; INTRAVENOUS; SUBCONJUNCTIVAL
Status: COMPLETED | OUTPATIENT
Start: 2024-04-01 | End: 2024-04-01

## 2024-04-01 RX ORDER — METHYLPREDNISOLONE 4 MG/1
TABLET ORAL
Qty: 21 EACH | Refills: 0 | Status: SHIPPED | OUTPATIENT
Start: 2024-04-01 | End: 2024-04-22

## 2024-04-01 RX ORDER — DEXAMETHASONE SODIUM PHOSPHATE 4 MG/ML
4 INJECTION, SOLUTION INTRA-ARTICULAR; INTRALESIONAL; INTRAMUSCULAR; INTRAVENOUS; SOFT TISSUE
Status: COMPLETED | OUTPATIENT
Start: 2024-04-01 | End: 2024-04-01

## 2024-04-01 RX ORDER — AZITHROMYCIN 250 MG/1
TABLET, FILM COATED ORAL
Qty: 6 TABLET | Refills: 0 | Status: SHIPPED | OUTPATIENT
Start: 2024-04-01 | End: 2024-04-06

## 2024-04-01 RX ORDER — CICLOPIROX OLAMINE 7.7 MG/G
CREAM TOPICAL 2 TIMES DAILY
COMMUNITY
Start: 2024-01-15

## 2024-04-01 RX ORDER — KETOCONAZOLE 20 MG/ML
SHAMPOO, SUSPENSION TOPICAL
COMMUNITY
Start: 2023-12-19

## 2024-04-01 RX ADMIN — DEXAMETHASONE SODIUM PHOSPHATE 4 MG: 4 INJECTION, SOLUTION INTRA-ARTICULAR; INTRALESIONAL; INTRAMUSCULAR; INTRAVENOUS; SOFT TISSUE at 03:04

## 2024-04-01 RX ADMIN — LINCOMYCIN HYDROCHLORIDE 600 MG: 300 INJECTION, SOLUTION INTRAMUSCULAR; INTRAVENOUS; SUBCONJUNCTIVAL at 03:04

## 2024-04-01 NOTE — PROGRESS NOTES
"   LELAND Elizabeth   [facility]  [address]  [place] [address]     PATIENT NAME: Geoffrey Gleason  : 1950  DATE: 24  MRN: [id number]      Billing Provider: LELAND Elizabeth  Level of Service:   Patient PCP Information       Provider PCP Type    LELAND Elizabeth [facility]            Reason for Visit / Chief Complaint: COPD (Pt is here with sob,wheezing and pains on the left side of chest since last thursday. Last chest xray was December.)   Health Maintenance Due   Topic Date Due    Hepatitis C Screening  Never done    Shingles Vaccine (1 of 2) Never done    RSV Vaccine (Age 60+ and Pregnant patients) (1 - 1-dose 60+ series) Never done    Abdominal Aortic Aneurysm Screening  Never done    DEXA Scan  2023          History of Present Illness / Problem Focused Workflow     Geoffrey Gleason presents to the clinic with his wife today due to worsening sob with his COPD. Pt was seen here in February with same symptoms and received abx/steroid shot along with oral steroid that did help. He was also seen in November for the same symptoms. O2 sat is 63% at start of visit. Pt does use home O2 but does not have any portable oxygen and would like to see about getting this. Pt is not having any visible trouble breathing. He denies any other uri symptoms such as headaches, sore throat, earaches, abd pain, n/v/d, rash. He does have a cough with productive sputum of "tan" colored along with a runny nose. He has not had fever that he knows of but does have low grade fever today. He has been taking his breathing treatments as prescribed. Pt also is a current smoker. He sees Pulmonologist Dr. Chen and has f/u appt with him in May. Pt states it has been December since he has had a cxr. He also states the left side of his ribs have been sore and wife thinks it may be pleurisy as she has had this before as well. O2 sat at end of visit is 81%. O2 usually comes with using oxygen. He denies any other needs " "at this time. NAD noted.     No results found for: "HGBA1C"     CMP  Sodium   Date Value Ref Range Status   03/03/2022 145 136 - 145 mmol/L Final     Potassium   Date Value Ref Range Status   03/03/2022 3.8 3.5 - 5.1 mmol/L Final     Chloride   Date Value Ref Range Status   03/03/2022 104 98 - 107 mmol/L Final     CO2   Date Value Ref Range Status   03/03/2022 36 (H) 21 - 32 mmol/L Final     Glucose   Date Value Ref Range Status   03/03/2022 101 74 - 106 mg/dL Final     BUN   Date Value Ref Range Status   03/03/2022 6 (L) 7 - 18 mg/dL Final     Creatinine   Date Value Ref Range Status   03/03/2022 0.77 0.70 - 1.30 mg/dL Final     Calcium   Date Value Ref Range Status   03/03/2022 8.3 (L) 8.5 - 10.1 mg/dL Final     Total Protein   Date Value Ref Range Status   03/03/2022 6.0 (L) 6.4 - 8.2 g/dL Final     Albumin   Date Value Ref Range Status   03/03/2022 3.4 (L) 3.5 - 5.0 g/dL Final     Bilirubin, Total   Date Value Ref Range Status   03/03/2022 0.8 0.0 - 1.2 mg/dL Final     Alk Phos   Date Value Ref Range Status   03/03/2022 87 45 - 115 U/L Final     AST   Date Value Ref Range Status   03/03/2022 15 15 - 37 U/L Final     ALT   Date Value Ref Range Status   03/03/2022 21 16 - 61 U/L Final     Anion Gap   Date Value Ref Range Status   03/03/2022 9 7 - 16 mmol/L Final        Lab Results   Component Value Date    WBC 7.84 03/03/2022    RBC 5.34 03/03/2022    HGB 15.7 03/03/2022    HCT 47.4 03/03/2022    MCV 88.8 03/03/2022    MCH 29.4 03/03/2022    MCHC 33.1 03/03/2022    RDW 13.6 03/03/2022     03/03/2022    MPV 10.2 03/03/2022    LYMPH 11.7 (L) 03/03/2022    LYMPH 0.92 (L) 03/03/2022    MONO 7.5 (H) 03/03/2022    EOS 0.24 03/03/2022    BASO 0.03 03/03/2022    EOSINOPHIL 3.1 03/03/2022    BASOPHIL 0.4 03/03/2022        No results found for: "CHOL"  No results found for: "HDL"  No results found for: "LDLCALC"  No results found for: "TRIG"  No results found for: "CHOLHDL"     Wt Readings from Last 3 Encounters: "   04/01/24 1517 72.2 kg (159 lb 3.2 oz)   02/09/24 0944 76.5 kg (168 lb 9.6 oz)   11/28/23 1105 73.7 kg (162 lb 6.4 oz)        BP Readings from Last 3 Encounters:   04/01/24 138/77   02/09/24 116/67   11/28/23 115/71        Review of Systems     Review of Systems   Constitutional:  Positive for appetite change, fatigue and fever.   HENT:  Positive for rhinorrhea.    Eyes: Negative.    Respiratory:  Positive for cough and shortness of breath. Negative for apnea and wheezing.    Cardiovascular: Negative.    Gastrointestinal: Negative.    Endocrine: Negative.    Genitourinary: Negative.    Musculoskeletal: Negative.    Integumentary:  Negative.   Allergic/Immunologic: Negative.    Neurological: Negative.    Hematological: Negative.    Psychiatric/Behavioral: Negative.          Medical / Social / Family History     Past Medical History:   Diagnosis Date    Cellulitis 04/14/2022    COPD (chronic obstructive pulmonary disease)     COPD with acute exacerbation 11/28/2023    Shortness of breath 03/03/2022    Swelling of both ankles 04/14/2022    Swelling of both lower extremities 04/18/2022       History reviewed. No pertinent surgical history.    Social History    reports that he has been smoking cigarettes. He started smoking about 56 years ago. He has never used smokeless tobacco. He reports that he does not currently use alcohol. He reports that he does not use drugs.    Family History  's family history is not on file.    Medications and Allergies     Medications  Outpatient Medications Marked as Taking for the 4/1/24 encounter (Office Visit) with Moon Shields FNP   Medication Sig Dispense Refill    AREXVY, PF, 120 mcg/0.5 mL SusR vaccine       budesonide (PULMICORT) 0.5 mg/2 mL nebulizer solution 2 ml      ciclopirox (LOPROX) 0.77 % Crea Apply topically 2 (two) times daily.      DALIRESP 500 mcg Tab Take 1 tablet by mouth once daily.      erythromycin (ROMYCIN) ophthalmic ointment SMARTSIG:Sparingly Left Eye  "Twice Daily      gabapentin (NEURONTIN) 300 MG capsule Take 1 capsule (300 mg total) by mouth every evening. 90 capsule 1    ipratropium (ATROVENT) 42 mcg (0.06 %) nasal spray INSTILL TWO SPRAYS IN EACH NOSTRIL THREE TIMES DAILY AS NEEDED. INSERT IN NOSE POINT TOWARD EYE AND SPRAY. DO NOT SNORT MEDICATION      ketoconazole (NIZORAL) 2 % shampoo SMARTSIG:Topical 2-3 Times Weekly      levalbuterol (XOPENEX HFA) 45 mcg/actuation inhaler SMARTSI Puff(s) By Mouth Every 6 Hours PRN      levalbuterol (XOPENEX) 1.25 mg/3 mL nebulizer solution USE ONE vial via NEBULIZER EVERY 8 HOURS AS NEEDED      promethazine-dextromethorphan (PROMETHAZINE-DM) 6.25-15 mg/5 mL Syrp Take 5 mLs by mouth every 6 (six) hours as needed (as needed). 240 mL 0    tamsulosin (FLOMAX) 0.4 mg Cap Take 1 capsule by mouth once daily.      umeclidinium (INCRUSE ELLIPTA) 62.5 mcg/actuation inhalation capsule INHALE ONE PUFF ONCE DAILY       Current Facility-Administered Medications for the 24 encounter (Office Visit) with Moon Shields FNP   Medication Dose Route Frequency Provider Last Rate Last Admin    [COMPLETED] dexAMETHasone injection 4 mg  4 mg Intramuscular 1 time in Clinic/HOD Moon Shields FNP   4 mg at 24 1552    [COMPLETED] lincomycin injection 600 mg  600 mg Intramuscular 1 time in Clinic/HOD Moon Shields FNP   600 mg at 24 1552       Allergies  Review of patient's allergies indicates:   Allergen Reactions    Cephalexin Hives and Other (See Comments)     Swelling SOB      Amoxicillin Rash       Physical Examination     Vitals:    24 1517 24 1558   BP: 138/77    BP Location: Left arm    Patient Position: Sitting    BP Method: Large (Automatic)    Pulse: (!) 119    Resp: (!) 24    Temp: 99.6 °F (37.6 °C)    TempSrc: Oral    SpO2: (!) 63% (!) 81%   Weight: 72.2 kg (159 lb 3.2 oz)    Height: 5' 9" (1.753 m)       Physical Exam  Constitutional:       Appearance: Normal appearance.   HENT:      Head: Normocephalic. "      Right Ear: Hearing, ear canal and external ear normal. A middle ear effusion is present.      Left Ear: Hearing, ear canal and external ear normal. Tympanic membrane is erythematous.      Nose: Rhinorrhea present.      Right Turbinates: Swollen.      Left Turbinates: Swollen.      Mouth/Throat:      Mouth: Mucous membranes are moist.      Pharynx: Oropharynx is clear.   Eyes:      Extraocular Movements: Extraocular movements intact.   Cardiovascular:      Rate and Rhythm: Normal rate and regular rhythm.      Pulses: Normal pulses.      Heart sounds: Normal heart sounds.   Pulmonary:      Effort: Pulmonary effort is normal.      Breath sounds: Normal breath sounds.   Musculoskeletal:         General: Normal range of motion.   Skin:     General: Skin is warm and dry.      Capillary Refill: Capillary refill takes less than 2 seconds.   Neurological:      General: No focal deficit present.      Mental Status: He is alert and oriented to person, place, and time.   Psychiatric:         Mood and Affect: Mood normal.         Behavior: Behavior normal.          Assessment and Plan (including Health Maintenance)   :    Plan:     There are no Patient Instructions on file for this visit.       Health Maintenance Due   Topic Date Due    Hepatitis C Screening  Never done    Shingles Vaccine (1 of 2) Never done    RSV Vaccine (Age 60+ and Pregnant patients) (1 - 1-dose 60+ series) Never done    Abdominal Aortic Aneurysm Screening  Never done    DEXA Scan  09/21/2023       Problem List Items Addressed This Visit       COPD exacerbation - Primary    Current Assessment & Plan     Lincomycin 600mg/Decadron 4mg injections given. No adverse reaction noted.   Zpak prescribed to take as directed. Instructed to take all abx until gone even if start to feel better.    Medrol dose bro prescribed to take as directed.   Continue home medications.  Instructed to RTC for any new/worsening/persisting ssx.           Relevant Medications     lincomycin injection 600 mg (Completed)    dexAMETHasone injection 4 mg (Completed)    methylPREDNISolone (MEDROL DOSEPACK) 4 mg tablet    azithromycin (Z-MARCIA) 250 MG tablet    Other Relevant Orders    OXYGEN FOR HOME USE    Left otitis media    Current Assessment & Plan     Lincomycin 600mg injection given. No adverse reaction noted.   Zpak prescribed to take as directed. Instructed to take all abx until gone even if start to feel better.    Instructed to RTC for any new/worsening/persisting ssx.           SOB (shortness of breath)    Current Assessment & Plan     CXR pending. Will inform of results when available.   Order for portable oxygen placed.          Relevant Orders    X-Ray Chest PA And Lateral    OXYGEN FOR HOME USE     COPD exacerbation  -     lincomycin injection 600 mg  -     dexAMETHasone injection 4 mg  -     methylPREDNISolone (MEDROL DOSEPACK) 4 mg tablet; use as directed  Dispense: 21 each; Refill: 0  -     azithromycin (Z-MARCIA) 250 MG tablet; Take 2 tablets by mouth on day 1; Take 1 tablet by mouth on days 2-5  Dispense: 6 tablet; Refill: 0  -     OXYGEN FOR HOME USE    SOB (shortness of breath)  -     X-Ray Chest PA And Lateral; Future; Expected date: 04/01/2024  -     OXYGEN FOR HOME USE    Other non-recurrent acute nonsuppurative otitis media of left ear       Health Maintenance Topics with due status: Not Due       Topic Last Completion Date    Colorectal Cancer Screening 02/05/2016    TETANUS VACCINE 02/15/2020    Lipid Panel 05/20/2022         No future appointments.     Follow up if symptoms worsen or fail to improve.    Health Maintenance Due   Topic Date Due    Hepatitis C Screening  Never done    Shingles Vaccine (1 of 2) Never done    RSV Vaccine (Age 60+ and Pregnant patients) (1 - 1-dose 60+ series) Never done    Abdominal Aortic Aneurysm Screening  Never done    DEXA Scan  09/21/2023        Signature:  LELAND Elizabeth    Date of encounter: 4/1/24

## 2024-04-01 NOTE — ASSESSMENT & PLAN NOTE
Lincomycin 600mg/Decadron 4mg injections given. No adverse reaction noted.   Zpak prescribed to take as directed. Instructed to take all abx until gone even if start to feel better.    Medrol dose bro prescribed to take as directed.   Continue home medications.  Instructed to RTC for any new/worsening/persisting ssx.

## 2024-04-01 NOTE — ASSESSMENT & PLAN NOTE
Lincomycin 600mg injection given. No adverse reaction noted.   Zpak prescribed to take as directed. Instructed to take all abx until gone even if start to feel better.    Instructed to RTC for any new/worsening/persisting ssx.

## 2024-04-02 NOTE — PROGRESS NOTES
Please let pt know his cxr does show pneumonia on his left side. He is already being treated for this, but if the meds are not working to let us know. Thanks!

## 2024-04-03 ENCOUNTER — TELEPHONE (OUTPATIENT)
Dept: FAMILY MEDICINE | Facility: CLINIC | Age: 74
End: 2024-04-03
Payer: MEDICARE

## 2024-04-03 NOTE — TELEPHONE ENCOUNTER
Pt called wanting to know if he needs any additional antibiotics since he was here yesterday. I explained that we gave him a lincomycin shot in the clinic and also prescribed a Zpak for pt to take at home so he shouldn't need any other antibiotics unless he isnt any better by Friday. Pt was tols to call back or come in Thursday or Friday if he still is not any better. Pt voiced understanding

## 2024-04-05 DIAGNOSIS — N40.1 BENIGN PROSTATIC HYPERPLASIA WITH LOWER URINARY TRACT SYMPTOMS, SYMPTOM DETAILS UNSPECIFIED: Primary | ICD-10-CM

## 2024-04-05 RX ORDER — TAMSULOSIN HYDROCHLORIDE 0.4 MG/1
1 CAPSULE ORAL DAILY
Qty: 90 CAPSULE | Refills: 3 | Status: SHIPPED | OUTPATIENT
Start: 2024-04-05

## 2024-04-08 ENCOUNTER — APPOINTMENT (OUTPATIENT)
Dept: RADIOLOGY | Facility: CLINIC | Age: 74
End: 2024-04-08
Attending: NURSE PRACTITIONER
Payer: MEDICARE

## 2024-04-08 ENCOUNTER — OFFICE VISIT (OUTPATIENT)
Dept: FAMILY MEDICINE | Facility: CLINIC | Age: 74
End: 2024-04-08
Payer: MEDICARE

## 2024-04-08 VITALS
SYSTOLIC BLOOD PRESSURE: 148 MMHG | HEART RATE: 96 BPM | DIASTOLIC BLOOD PRESSURE: 75 MMHG | WEIGHT: 155 LBS | HEIGHT: 69 IN | TEMPERATURE: 98 F | OXYGEN SATURATION: 83 % | BODY MASS INDEX: 22.96 KG/M2 | RESPIRATION RATE: 21 BRPM

## 2024-04-08 DIAGNOSIS — J18.9 PNEUMONIA OF RIGHT LUNG DUE TO INFECTIOUS ORGANISM, UNSPECIFIED PART OF LUNG: ICD-10-CM

## 2024-04-08 DIAGNOSIS — I27.29 OTHER SECONDARY PULMONARY HYPERTENSION: ICD-10-CM

## 2024-04-08 DIAGNOSIS — R06.02 SOB (SHORTNESS OF BREATH): ICD-10-CM

## 2024-04-08 DIAGNOSIS — C61 PROSTATE CANCER: ICD-10-CM

## 2024-04-08 DIAGNOSIS — J18.9 PNEUMONIA OF LEFT LOWER LOBE DUE TO INFECTIOUS ORGANISM: Primary | ICD-10-CM

## 2024-04-08 LAB
ALBUMIN SERPL BCP-MCNC: 3.3 G/DL (ref 3.5–5)
ALBUMIN/GLOB SERPL: 1 {RATIO}
ALP SERPL-CCNC: 76 U/L (ref 45–115)
ALT SERPL W P-5'-P-CCNC: 29 U/L (ref 16–61)
ANION GAP SERPL CALCULATED.3IONS-SCNC: 8 MMOL/L (ref 7–16)
AST SERPL W P-5'-P-CCNC: 10 U/L (ref 15–37)
BASOPHILS # BLD AUTO: 0.05 K/UL (ref 0–0.2)
BASOPHILS NFR BLD AUTO: 0.6 % (ref 0–1)
BILIRUB SERPL-MCNC: 0.9 MG/DL (ref ?–1.2)
BUN SERPL-MCNC: 10 MG/DL (ref 7–18)
BUN/CREAT SERPL: 16 (ref 6–20)
CALCIUM SERPL-MCNC: 9 MG/DL (ref 8.5–10.1)
CHLORIDE SERPL-SCNC: 92 MMOL/L (ref 98–107)
CO2 SERPL-SCNC: 38 MMOL/L (ref 21–32)
CREAT SERPL-MCNC: 0.61 MG/DL (ref 0.7–1.3)
DIFFERENTIAL METHOD BLD: ABNORMAL
EGFR (NO RACE VARIABLE) (RUSH/TITUS): 101 ML/MIN/1.73M2
EOSINOPHIL # BLD AUTO: 0.19 K/UL (ref 0–0.5)
EOSINOPHIL NFR BLD AUTO: 2.5 % (ref 1–4)
ERYTHROCYTE [DISTWIDTH] IN BLOOD BY AUTOMATED COUNT: 13.9 % (ref 11.5–14.5)
GLOBULIN SER-MCNC: 3.3 G/DL (ref 2–4)
GLUCOSE SERPL-MCNC: 120 MG/DL (ref 74–106)
HCT VFR BLD AUTO: 51.9 % (ref 40–54)
HGB BLD-MCNC: 15.8 G/DL (ref 13.5–18)
IMM GRANULOCYTES # BLD AUTO: 0.01 K/UL (ref 0–0.04)
IMM GRANULOCYTES NFR BLD: 0.1 % (ref 0–0.4)
LYMPHOCYTES # BLD AUTO: 1.37 K/UL (ref 1–4.8)
LYMPHOCYTES NFR BLD AUTO: 17.8 % (ref 27–41)
MCH RBC QN AUTO: 27.3 PG (ref 27–31)
MCHC RBC AUTO-ENTMCNC: 30.4 G/DL (ref 32–36)
MCV RBC AUTO: 89.8 FL (ref 80–96)
MONOCYTES # BLD AUTO: 0.42 K/UL (ref 0–0.8)
MONOCYTES NFR BLD AUTO: 5.5 % (ref 2–6)
MPC BLD CALC-MCNC: 10 FL (ref 9.4–12.4)
NEUTROPHILS # BLD AUTO: 5.66 K/UL (ref 1.8–7.7)
NEUTROPHILS NFR BLD AUTO: 73.5 % (ref 53–65)
NRBC # BLD AUTO: 0 X10E3/UL
NRBC, AUTO (.00): 0 %
PLATELET # BLD AUTO: 239 K/UL (ref 150–400)
POTASSIUM SERPL-SCNC: 3.8 MMOL/L (ref 3.5–5.1)
PROT SERPL-MCNC: 6.6 G/DL (ref 6.4–8.2)
RBC # BLD AUTO: 5.78 M/UL (ref 4.6–6.2)
SODIUM SERPL-SCNC: 134 MMOL/L (ref 136–145)
WBC # BLD AUTO: 7.7 K/UL (ref 4.5–11)

## 2024-04-08 PROCEDURE — 71046 X-RAY EXAM CHEST 2 VIEWS: CPT | Mod: TC,RHCUB | Performed by: NURSE PRACTITIONER

## 2024-04-08 PROCEDURE — 99214 OFFICE O/P EST MOD 30 MIN: CPT | Mod: ,,, | Performed by: NURSE PRACTITIONER

## 2024-04-08 PROCEDURE — 80053 COMPREHEN METABOLIC PANEL: CPT | Mod: ,,, | Performed by: CLINICAL MEDICAL LABORATORY

## 2024-04-08 PROCEDURE — 71046 X-RAY EXAM CHEST 2 VIEWS: CPT | Mod: 26,,, | Performed by: RADIOLOGY

## 2024-04-08 PROCEDURE — 85025 COMPLETE CBC W/AUTO DIFF WBC: CPT | Mod: ,,, | Performed by: CLINICAL MEDICAL LABORATORY

## 2024-04-08 RX ORDER — LEVOFLOXACIN 500 MG/1
500 TABLET, FILM COATED ORAL DAILY
Qty: 5 TABLET | Refills: 0 | Status: SHIPPED | OUTPATIENT
Start: 2024-04-08 | End: 2024-05-08 | Stop reason: SDUPTHER

## 2024-04-09 NOTE — PROGRESS NOTES
Please let pt know his labs are stable. WBC are normal, as this would indicate worsening bacterial infection but I told him yesterday his cxr was read that his pneumonia was improving. Thanks!

## 2024-04-12 PROBLEM — J18.9 PNEUMONIA OF LEFT LOWER LOBE DUE TO INFECTIOUS ORGANISM: Status: ACTIVE | Noted: 2024-04-12

## 2024-04-12 PROBLEM — H66.92 LEFT OTITIS MEDIA: Status: RESOLVED | Noted: 2024-04-01 | Resolved: 2024-04-12

## 2024-04-12 NOTE — PROGRESS NOTES
"   LELAND Elizabeth   [facility]  [address]  [place] [address]     PATIENT NAME: Geoffrey Gleason  : 1950  DATE: 24  MRN: [id number]      Billing Provider: LELAND Elizabeth  Level of Service:   Patient PCP Information       Provider PCP Type    LELAND Elizabeth [facility]            Reason for Visit / Chief Complaint: Cough (Pt is here today with c/o sob he was here last week with pneumonia he said he is somewhat better but still short of breath.)   Health Maintenance Due   Topic Date Due    Hepatitis C Screening  Never done    Shingles Vaccine (1 of 2) Never done    RSV Vaccine (Age 60+ and Pregnant patients) (1 - 1-dose 60+ series) Never done    Abdominal Aortic Aneurysm Screening  Never done    DEXA Scan  2023          History of Present Illness / Problem Focused Workflow     Geoffrey Gleason presents to the clinic for check up on his pneumonia. Pt was here last week and diagnosed with pneumonia. He states he has taken all of the abx but is still sob and wanted to make sure it was not getting worse. O2 is low on room air coming up to 83% which is good for pt. Pt does use home oxygen and states he has portable tanks for O2 but is too heavy. I have already contacted [facility] and they were supposed to actually come to the clinic today to discuss other options for pt. Pt vu. Pt states he has f/u appt with Dr. Chen in May. He denies any other needs at this time. NAD noted.     No results found for: "HGBA1C"     CMP  Sodium   Date Value Ref Range Status   2024 134 (L) 136 - 145 mmol/L Final     Potassium   Date Value Ref Range Status   2024 3.8 3.5 - 5.1 mmol/L Final     Chloride   Date Value Ref Range Status   2024 92 (L) 98 - 107 mmol/L Final     CO2   Date Value Ref Range Status   2024 38 (H) 21 - 32 mmol/L Final     Glucose   Date Value Ref Range Status   2024 120 (H) 74 - 106 mg/dL Final     BUN   Date Value Ref Range Status   2024 10 7 - 18 " "mg/dL Final     Creatinine   Date Value Ref Range Status   04/08/2024 0.61 (L) 0.70 - 1.30 mg/dL Final     Calcium   Date Value Ref Range Status   04/08/2024 9.0 8.5 - 10.1 mg/dL Final     Total Protein   Date Value Ref Range Status   04/08/2024 6.6 6.4 - 8.2 g/dL Final     Albumin   Date Value Ref Range Status   04/08/2024 3.3 (L) 3.5 - 5.0 g/dL Final     Bilirubin, Total   Date Value Ref Range Status   04/08/2024 0.9 >0.0 - 1.2 mg/dL Final     Alk Phos   Date Value Ref Range Status   04/08/2024 76 45 - 115 U/L Final     AST   Date Value Ref Range Status   04/08/2024 10 (L) 15 - 37 U/L Final     ALT   Date Value Ref Range Status   04/08/2024 29 16 - 61 U/L Final     Anion Gap   Date Value Ref Range Status   04/08/2024 8 7 - 16 mmol/L Final     eGFR   Date Value Ref Range Status   04/08/2024 101 >=60 mL/min/1.73m2 Final        Lab Results   Component Value Date    WBC 7.70 04/08/2024    RBC 5.78 04/08/2024    HGB 15.8 04/08/2024    HCT 51.9 04/08/2024    MCV 89.8 04/08/2024    MCH 27.3 04/08/2024    MCHC 30.4 (L) 04/08/2024    RDW 13.9 04/08/2024     04/08/2024    MPV 10.0 04/08/2024    LYMPH 17.8 (L) 04/08/2024    LYMPH 1.37 04/08/2024    MONO 5.5 04/08/2024    EOS 0.19 04/08/2024    BASO 0.05 04/08/2024    EOSINOPHIL 2.5 04/08/2024    BASOPHIL 0.6 04/08/2024        No results found for: "CHOL"  No results found for: "HDL"  No results found for: "LDLCALC"  No results found for: "TRIG"  No results found for: "CHOLHDL"     Wt Readings from Last 3 Encounters:   04/08/24 1310 70.3 kg (155 lb)   04/01/24 1517 72.2 kg (159 lb 3.2 oz)   02/09/24 0944 76.5 kg (168 lb 9.6 oz)        BP Readings from Last 3 Encounters:   04/08/24 (!) 148/75   04/01/24 138/77   02/09/24 116/67        Review of Systems     Review of Systems   Constitutional: Negative.    HENT: Negative.     Eyes: Negative.    Respiratory:  Positive for cough and shortness of breath. Negative for apnea and wheezing.    Cardiovascular: Negative.  "   Gastrointestinal: Negative.    Endocrine: Negative.    Genitourinary: Negative.    Musculoskeletal: Negative.    Integumentary:  Negative.   Allergic/Immunologic: Negative.    Neurological: Negative.    Hematological: Negative.    Psychiatric/Behavioral: Negative.          Medical / Social / Family History     Past Medical History:   Diagnosis Date    Cellulitis 2022    COPD (chronic obstructive pulmonary disease)     COPD with acute exacerbation 2023    Left otitis media 2024    Shortness of breath 2022    Swelling of both ankles 2022    Swelling of both lower extremities 2022       History reviewed. No pertinent surgical history.    Social History    reports that he has been smoking cigarettes. He started smoking about 56 years ago. He has never used smokeless tobacco. He reports that he does not currently use alcohol. He reports that he does not use drugs.    Family History  's family history is not on file.    Medications and Allergies     Medications  Outpatient Medications Marked as Taking for the 24 encounter (Office Visit) with Moon Shields FNP   Medication Sig Dispense Refill    AREXVY, PF, 120 mcg/0.5 mL SusR vaccine       budesonide (PULMICORT) 0.5 mg/2 mL nebulizer solution 2 ml      ciclopirox (LOPROX) 0.77 % Crea Apply topically 2 (two) times daily.      DALIRESP 500 mcg Tab Take 1 tablet by mouth once daily.      erythromycin (ROMYCIN) ophthalmic ointment SMARTSIG:Sparingly Left Eye Twice Daily      gabapentin (NEURONTIN) 300 MG capsule Take 1 capsule (300 mg total) by mouth every evening. 90 capsule 1    ipratropium (ATROVENT) 42 mcg (0.06 %) nasal spray INSTILL TWO SPRAYS IN EACH NOSTRIL THREE TIMES DAILY AS NEEDED. INSERT IN NOSE POINT TOWARD EYE AND SPRAY. DO NOT SNORT MEDICATION      ketoconazole (NIZORAL) 2 % shampoo SMARTSIG:Topical 2-3 Times Weekly      levalbuterol (XOPENEX HFA) 45 mcg/actuation inhaler SMARTSI Puff(s) By Mouth Every 6 Hours  "PRN      levalbuterol (XOPENEX) 1.25 mg/3 mL nebulizer solution USE ONE vial via NEBULIZER EVERY 8 HOURS AS NEEDED      promethazine-dextromethorphan (PROMETHAZINE-DM) 6.25-15 mg/5 mL Syrp Take 5 mLs by mouth every 6 (six) hours as needed (as needed). 240 mL 0    tamsulosin (FLOMAX) 0.4 mg Cap Take 1 capsule (0.4 mg total) by mouth once daily. 90 capsule 3    umeclidinium (INCRUSE ELLIPTA) 62.5 mcg/actuation inhalation capsule INHALE ONE PUFF ONCE DAILY         Allergies  Review of patient's allergies indicates:   Allergen Reactions    Cephalexin Hives and Other (See Comments)     Swelling SOB      Amoxicillin Rash       Physical Examination     Vitals:    04/08/24 1310 04/08/24 1358   BP: (!) 148/75    BP Location: Left arm    Patient Position: Sitting    BP Method: Medium (Automatic)    Pulse: 106 96   Resp: (!) 21    Temp: 98.3 °F (36.8 °C)    TempSrc: Oral    SpO2: (!) 55% (!) 83%   Weight: 70.3 kg (155 lb)    Height: 5' 9" (1.753 m)       Physical Exam  Constitutional:       Appearance: Normal appearance.   HENT:      Head: Normocephalic.   Eyes:      Extraocular Movements: Extraocular movements intact.   Cardiovascular:      Rate and Rhythm: Normal rate and regular rhythm.      Pulses: Normal pulses.      Heart sounds: Normal heart sounds.   Pulmonary:      Effort: Pulmonary effort is normal.      Breath sounds: Normal breath sounds.   Skin:     General: Skin is warm and dry.      Capillary Refill: Capillary refill takes less than 2 seconds.   Neurological:      General: No focal deficit present.      Mental Status: He is alert and oriented to person, place, and time.   Psychiatric:         Mood and Affect: Mood normal.         Behavior: Behavior normal.          Assessment and Plan (including Health Maintenance)   :    Plan:     There are no Patient Instructions on file for this visit.       Health Maintenance Due   Topic Date Due    Hepatitis C Screening  Never done    Shingles Vaccine (1 of 2) Never done    " RSV Vaccine (Age 60+ and Pregnant patients) (1 - 1-dose 60+ series) Never done    Abdominal Aortic Aneurysm Screening  Never done    DEXA Scan  09/21/2023       Problem List Items Addressed This Visit       Other secondary pulmonary hypertension    Current Assessment & Plan     Followed by specialist.          Pneumonia of left lower lobe due to infectious organism - Primary    Current Assessment & Plan     Repeat cxr pending. Will inform of result when available.   Levaquin prescribed to take as directed. Instructed to take all abx until gone even if start to feel better.    Continue current meds/oxygen at home.  Instructed to RTC for any new/worsening/persisting ssx.           Relevant Medications    levoFLOXacin (LEVAQUIN) 500 MG tablet    Other Relevant Orders    X-Ray Chest PA And Lateral (Completed)    CBC Auto Differential (Completed)    Comprehensive Metabolic Panel (Completed)    Prostate cancer    Current Assessment & Plan     Followed by Specialist.          SOB (shortness of breath)    Current Assessment & Plan     See below plan.          Pneumonia of left lower lobe due to infectious organism  -     X-Ray Chest PA And Lateral; Future; Expected date: 04/08/2024  -     CBC Auto Differential; Future; Expected date: 04/08/2024  -     Comprehensive Metabolic Panel; Future; Expected date: 04/08/2024  -     levoFLOXacin (LEVAQUIN) 500 MG tablet; Take 1 tablet (500 mg total) by mouth once daily.  Dispense: 5 tablet; Refill: 0    Prostate cancer    Other secondary pulmonary hypertension    SOB (shortness of breath)       Health Maintenance Topics with due status: Not Due       Topic Last Completion Date    Colorectal Cancer Screening 02/05/2016    TETANUS VACCINE 02/15/2020    Lipid Panel 05/20/2022         No future appointments.     Follow up if symptoms worsen or fail to improve.    Health Maintenance Due   Topic Date Due    Hepatitis C Screening  Never done    Shingles Vaccine (1 of 2) Never done    RSV  Vaccine (Age 60+ and Pregnant patients) (1 - 1-dose 60+ series) Never done    Abdominal Aortic Aneurysm Screening  Never done    DEXA Scan  09/21/2023        Signature:  LELAND Elizabeth    Date of encounter: 4/8/24

## 2024-04-12 NOTE — ASSESSMENT & PLAN NOTE
Repeat cxr pending. Will inform of result when available.   Levaquin prescribed to take as directed. Instructed to take all abx until gone even if start to feel better.    Continue current meds/oxygen at home.  Instructed to RTC for any new/worsening/persisting ssx.

## 2024-05-08 ENCOUNTER — OFFICE VISIT (OUTPATIENT)
Dept: FAMILY MEDICINE | Facility: CLINIC | Age: 74
End: 2024-05-08
Payer: MEDICARE

## 2024-05-08 VITALS
WEIGHT: 163.81 LBS | RESPIRATION RATE: 22 BRPM | DIASTOLIC BLOOD PRESSURE: 65 MMHG | OXYGEN SATURATION: 75 % | SYSTOLIC BLOOD PRESSURE: 138 MMHG | TEMPERATURE: 98 F | HEIGHT: 69 IN | HEART RATE: 95 BPM | BODY MASS INDEX: 24.26 KG/M2

## 2024-05-08 DIAGNOSIS — H65.91 MIDDLE EAR EFFUSION, RIGHT: ICD-10-CM

## 2024-05-08 DIAGNOSIS — H66.002 NON-RECURRENT ACUTE SUPPURATIVE OTITIS MEDIA OF LEFT EAR WITHOUT SPONTANEOUS RUPTURE OF TYMPANIC MEMBRANE: ICD-10-CM

## 2024-05-08 DIAGNOSIS — J44.1 COPD EXACERBATION: Primary | ICD-10-CM

## 2024-05-08 PROBLEM — J18.9 PNEUMONIA OF LEFT LOWER LOBE DUE TO INFECTIOUS ORGANISM: Status: RESOLVED | Noted: 2024-04-12 | Resolved: 2024-05-08

## 2024-05-08 PROCEDURE — 99213 OFFICE O/P EST LOW 20 MIN: CPT | Mod: ,,, | Performed by: NURSE PRACTITIONER

## 2024-05-08 PROCEDURE — 96372 THER/PROPH/DIAG INJ SC/IM: CPT | Mod: ,,, | Performed by: NURSE PRACTITIONER

## 2024-05-08 RX ORDER — PREDNISONE 5 MG/1
2.5 TABLET ORAL DAILY
Qty: 15 TABLET | Refills: 0 | Status: SHIPPED | OUTPATIENT
Start: 2024-05-08 | End: 2024-06-17

## 2024-05-08 RX ORDER — DEXAMETHASONE SODIUM PHOSPHATE 4 MG/ML
4 INJECTION, SOLUTION INTRA-ARTICULAR; INTRALESIONAL; INTRAMUSCULAR; INTRAVENOUS; SOFT TISSUE
Status: COMPLETED | OUTPATIENT
Start: 2024-05-08 | End: 2024-05-08

## 2024-05-08 RX ORDER — METHYLPREDNISOLONE ACETATE 40 MG/ML
40 INJECTION, SUSPENSION INTRA-ARTICULAR; INTRALESIONAL; INTRAMUSCULAR; SOFT TISSUE
Status: COMPLETED | OUTPATIENT
Start: 2024-05-08 | End: 2024-05-08

## 2024-05-08 RX ORDER — LINCOMYCIN HYDROCHLORIDE 300 MG/ML
600 INJECTION, SOLUTION INTRAMUSCULAR; INTRAVENOUS; SUBCONJUNCTIVAL
Status: COMPLETED | OUTPATIENT
Start: 2024-05-08 | End: 2024-05-08

## 2024-05-08 RX ORDER — LEVOFLOXACIN 500 MG/1
500 TABLET, FILM COATED ORAL DAILY
Qty: 5 TABLET | Refills: 0 | Status: SHIPPED | OUTPATIENT
Start: 2024-05-08 | End: 2024-06-17 | Stop reason: SDUPTHER

## 2024-05-08 RX ADMIN — LINCOMYCIN HYDROCHLORIDE 600 MG: 300 INJECTION, SOLUTION INTRAMUSCULAR; INTRAVENOUS; SUBCONJUNCTIVAL at 10:05

## 2024-05-08 RX ADMIN — DEXAMETHASONE SODIUM PHOSPHATE 4 MG: 4 INJECTION, SOLUTION INTRA-ARTICULAR; INTRALESIONAL; INTRAMUSCULAR; INTRAVENOUS; SOFT TISSUE at 10:05

## 2024-05-08 RX ADMIN — METHYLPREDNISOLONE ACETATE 40 MG: 40 INJECTION, SUSPENSION INTRA-ARTICULAR; INTRALESIONAL; INTRAMUSCULAR; SOFT TISSUE at 10:05

## 2024-05-08 NOTE — PROGRESS NOTES
LELAND Elizabeth   [facility]  [address]  [place] [address]     PATIENT NAME: Geoffrey Gleason  : 1950  DATE: 24  MRN: [id number]      Billing Provider: LELAND Elizabeth  Level of Service: IA OFFICE/OUTPT VISIT, EST, LEVL III, 20-29 MIN  Patient PCP Information       Provider PCP Type    LELAND Elizabeth [facility]            Reason for Visit / Chief Complaint: Shortness of Breath (Dyspnea /Feet started swelling about a week ago /Headache started about a week ago as well)   Health Maintenance Due   Topic Date Due    Hepatitis C Screening  Never done    Shingles Vaccine (1 of 2) Never done    RSV Vaccine (Age 60+ and Pregnant patients) (1 - 1-dose 60+ series) Never done    DEXA Scan  2023          Update PCP  Update Chief Complaint         History of Present Illness / Problem Focused Workflow     Geoffrey Gleason presents to the clinic with cough with white, thick sputum. He denies fever. He has had runny nose, sore throat, and nasal congestion as well. He states approx one week ago he started swelling in his lower extremities. He states he saw Cardiologist approx 2 months ago at [facility] Dr. Treviño due to recent eye surgery and did not hear anything from them so he guessed everything was okay. Informed I can get records for him on this. He does have a fluid pill he can take as needed and he did take one yesterday but not today. He is sob, but he states not worse than usual. Pt has been having this issue every few months but he states he seems like it has been more frequent lately. Pt did well on Levaquin from last visit on 24. He states he will see Pulmonology Dr. Chen either at the end of this month of next month. He is taking inhalers/breathing treatments as prescribed. O2 is 73% on room air. Pt does have O2 at home but states he does not use during the day. He states he does use it at night. I have told him again to put his oxygen on during the day to help with this. He is  "requesting a longer duration of steroid be sent in. Informed I will send in low dose of steroid until he can see Pulmonology. Pt is still asking about getting portable oxygen. I did call [facility] the last time pt was here and they were supposed to go out to pt's house and assess what pt actually had so we could see if he could portable O2. He states that never happened. I called again today asking if pt could get portable oxygen and I was told he had portable oxygen, but what pt states he has is the portable O2 tanks, and pt is wanting the small portable concentrator he can take with him. [facility] also states she can send a RT out to pt home to assess what meds pt is on and if he is using them correctly and also to see what portable O2 pt has. She will put order in for this. Pt denies any other needs at this time. NAD noted.     No results found for: "HGBA1C"     CMP  Sodium   Date Value Ref Range Status   04/08/2024 134 (L) 136 - 145 mmol/L Final     Potassium   Date Value Ref Range Status   04/08/2024 3.8 3.5 - 5.1 mmol/L Final     Chloride   Date Value Ref Range Status   04/08/2024 92 (L) 98 - 107 mmol/L Final     CO2   Date Value Ref Range Status   04/08/2024 38 (H) 21 - 32 mmol/L Final     Glucose   Date Value Ref Range Status   04/08/2024 120 (H) 74 - 106 mg/dL Final     BUN   Date Value Ref Range Status   04/08/2024 10 7 - 18 mg/dL Final     Creatinine   Date Value Ref Range Status   04/08/2024 0.61 (L) 0.70 - 1.30 mg/dL Final     Calcium   Date Value Ref Range Status   04/08/2024 9.0 8.5 - 10.1 mg/dL Final     Total Protein   Date Value Ref Range Status   04/08/2024 6.6 6.4 - 8.2 g/dL Final     Albumin   Date Value Ref Range Status   04/08/2024 3.3 (L) 3.5 - 5.0 g/dL Final     Bilirubin, Total   Date Value Ref Range Status   04/08/2024 0.9 >0.0 - 1.2 mg/dL Final     Alk Phos   Date Value Ref Range Status   04/08/2024 76 45 - 115 U/L Final     AST   Date Value Ref Range Status   04/08/2024 10 (L) 15 - 37 U/L " "Final     ALT   Date Value Ref Range Status   04/08/2024 29 16 - 61 U/L Final     Anion Gap   Date Value Ref Range Status   04/08/2024 8 7 - 16 mmol/L Final     eGFR   Date Value Ref Range Status   04/08/2024 101 >=60 mL/min/1.73m2 Final        Lab Results   Component Value Date    WBC 7.70 04/08/2024    RBC 5.78 04/08/2024    HGB 15.8 04/08/2024    HCT 51.9 04/08/2024    MCV 89.8 04/08/2024    MCH 27.3 04/08/2024    MCHC 30.4 (L) 04/08/2024    RDW 13.9 04/08/2024     04/08/2024    MPV 10.0 04/08/2024    LYMPH 17.8 (L) 04/08/2024    LYMPH 1.37 04/08/2024    MONO 5.5 04/08/2024    EOS 0.19 04/08/2024    BASO 0.05 04/08/2024    EOSINOPHIL 2.5 04/08/2024    BASOPHIL 0.6 04/08/2024        No results found for: "CHOL"  No results found for: "HDL"  No results found for: "LDLCALC"  No results found for: "TRIG"  No results found for: "CHOLHDL"     Wt Readings from Last 3 Encounters:   05/08/24 0947 74.3 kg (163 lb 12.8 oz)   04/08/24 1310 70.3 kg (155 lb)   04/01/24 1517 72.2 kg (159 lb 3.2 oz)        BP Readings from Last 3 Encounters:   05/08/24 138/65   04/08/24 (!) 148/75   04/01/24 138/77        Review of Systems     Review of Systems   Constitutional: Negative.    HENT: Negative.     Eyes: Negative.    Respiratory:  Positive for cough and shortness of breath.    Cardiovascular: Negative.    Gastrointestinal: Negative.    Endocrine: Negative.    Genitourinary: Negative.    Musculoskeletal: Negative.    Integumentary:  Negative.   Allergic/Immunologic: Negative.    Neurological: Negative.    Hematological: Negative.    Psychiatric/Behavioral: Negative.          Medical / Social / Family History     Past Medical History:   Diagnosis Date    Cellulitis 04/14/2022    COPD (chronic obstructive pulmonary disease)     COPD with acute exacerbation 11/28/2023    Left otitis media 04/01/2024    Pneumonia of left lower lobe due to infectious organism 04/12/2024    Shortness of breath 03/03/2022    Swelling of both ankles " "2022    Swelling of both lower extremities 2022         History reviewed. No pertinent surgical history.    Social History    reports that he has been smoking cigarettes. He started smoking about 56 years ago. He has never used smokeless tobacco. He reports that he does not currently use alcohol. He reports that he does not use drugs.    Family History  's family history is not on file.    Medications and Allergies     Medications  Outpatient Medications Marked as Taking for the 24 encounter (Office Visit) with Moon Shields FNP   Medication Sig Dispense Refill    budesonide (PULMICORT) 0.5 mg/2 mL nebulizer solution 2 ml      ciclopirox (LOPROX) 0.77 % Crea Apply topically 2 (two) times daily.      DALIRESP 500 mcg Tab Take 1 tablet by mouth once daily.      gabapentin (NEURONTIN) 300 MG capsule Take 1 capsule (300 mg total) by mouth every evening. 90 capsule 1    ketoconazole (NIZORAL) 2 % shampoo SMARTSIG:Topical 2-3 Times Weekly      levalbuterol (XOPENEX HFA) 45 mcg/actuation inhaler SMARTSI Puff(s) By Mouth Every 6 Hours PRN      levalbuterol (XOPENEX) 1.25 mg/3 mL nebulizer solution USE ONE vial via NEBULIZER EVERY 8 HOURS AS NEEDED      tamsulosin (FLOMAX) 0.4 mg Cap Take 1 capsule (0.4 mg total) by mouth once daily. 90 capsule 3       Allergies  Review of patient's allergies indicates:   Allergen Reactions    Cephalexin Hives and Other (See Comments)     Swelling SOB      Amoxicillin Rash       Physical Examination     Vitals:    24 0947 24 1439   BP: 138/65    BP Location: Left arm    Patient Position: Sitting    BP Method: Medium (Automatic)    Pulse: 95    Resp: (!) 22    Temp: 98.3 °F (36.8 °C)    TempSrc: Oral    SpO2: (!) 73% (!) 75%   Weight: 74.3 kg (163 lb 12.8 oz)    Height: 5' 9" (1.753 m)       Physical Exam  Constitutional:       Appearance: Normal appearance.   HENT:      Head: Normocephalic.      Right Ear: Hearing, ear canal and external ear normal. A " middle ear effusion is present.      Left Ear: Hearing, ear canal and external ear normal. Tympanic membrane is erythematous.      Nose: Congestion present.      Right Turbinates: Swollen.      Left Turbinates: Swollen.      Mouth/Throat:      Mouth: Mucous membranes are moist.      Pharynx: Oropharynx is clear.   Eyes:      Extraocular Movements: Extraocular movements intact.   Cardiovascular:      Rate and Rhythm: Normal rate and regular rhythm.      Pulses: Normal pulses.      Heart sounds: Normal heart sounds.   Pulmonary:      Effort: Pulmonary effort is normal. No tachypnea, bradypnea, accessory muscle usage, prolonged expiration, respiratory distress or retractions.      Breath sounds: Normal breath sounds.   Skin:     General: Skin is warm and dry.      Capillary Refill: Capillary refill takes less than 2 seconds.   Neurological:      General: No focal deficit present.      Mental Status: He is alert and oriented to person, place, and time.   Psychiatric:         Mood and Affect: Mood normal.         Behavior: Behavior normal.          Assessment and Plan (including Health Maintenance)      Problem List  Smart Sets  Document Outside HM   :    Plan:     There are no Patient Instructions on file for this visit.       Health Maintenance Due   Topic Date Due    Hepatitis C Screening  Never done    Shingles Vaccine (1 of 2) Never done    RSV Vaccine (Age 60+ and Pregnant patients) (1 - 1-dose 60+ series) Never done    DEXA Scan  09/21/2023       Problem List Items Addressed This Visit       COPD exacerbation - Primary    Current Assessment & Plan     Lincomycin 600mg/Decadron 4mg/Depo Medrol 40mg injections given. No adverse reaction noted.   Levaquin prescribed to take as directed. Instructed to take all abx until gone even if start to feel better.    Prednisone 2.5mg prescribed to take as directed for 4 weeks.   Continue home medications.  Instructed to RTC for any new/worsening/persisting ssx.     Declines  home health at this time. Informed pt home health could come out and help monitor vitals and inform provider with any worsening conditions. Pt vu but declines at this time.   Keep f/u with Dr. Chen.          Relevant Medications    levoFLOXacin (LEVAQUIN) 500 MG tablet    predniSONE (DELTASONE) 5 MG tablet    Middle ear effusion, right    Current Assessment & Plan     Lincomycin 600mg/Decadron 4mg/Depo Medrol 40mg injections given. No adverse reaction noted.   Levaquin prescribed to take as directed. Instructed to take all abx until gone even if start to feel better.    Prednisone 2.5mg prescribed to take as directed for 4 weeks.   Continue home medications.  Instructed to RTC for any new/worsening/persisting ssx.             Non-recurrent acute suppurative otitis media of left ear without spontaneous rupture of tympanic membrane    Current Assessment & Plan     Lincomycin 600mg/Decadron 4mg/Depo Medrol 40mg injections given. No adverse reaction noted.   Levaquin prescribed to take as directed. Instructed to take all abx until gone even if start to feel better.    Instructed to RTC for any new/worsening/persisting ssx.              COPD exacerbation  -     levoFLOXacin (LEVAQUIN) 500 MG tablet; Take 1 tablet (500 mg total) by mouth once daily.  Dispense: 5 tablet; Refill: 0  -     predniSONE (DELTASONE) 5 MG tablet; Take 0.5 tablets (2.5 mg total) by mouth once daily.  Dispense: 15 tablet; Refill: 0  -     dexAMETHasone injection 4 mg  -     methylPREDNISolone acetate injection 40 mg  -     lincomycin injection 600 mg    Non-recurrent acute suppurative otitis media of left ear without spontaneous rupture of tympanic membrane    Middle ear effusion, right       Health Maintenance Topics with due status: Not Due       Topic Last Completion Date    Colorectal Cancer Screening 02/05/2016    TETANUS VACCINE 02/15/2020    Lipid Panel 05/20/2022         No future appointments.     No follow-ups on file.    Health  Maintenance Due   Topic Date Due    Hepatitis C Screening  Never done    Shingles Vaccine (1 of 2) Never done    RSV Vaccine (Age 60+ and Pregnant patients) (1 - 1-dose 60+ series) Never done    DEXA Scan  09/21/2023        Signature:  LELAND Elizabeth    Date of encounter: 5/8/24

## 2024-05-08 NOTE — ASSESSMENT & PLAN NOTE
Lincomycin 600mg/Decadron 4mg/Depo Medrol 40mg injections given. No adverse reaction noted.   Levaquin prescribed to take as directed. Instructed to take all abx until gone even if start to feel better.    Prednisone 2.5mg prescribed to take as directed for 4 weeks.   Continue home medications.  Instructed to RTC for any new/worsening/persisting ssx.     Declines home health at this time. Informed pt home health could come out and help monitor vitals and inform provider with any worsening conditions. Pt vu but declines at this time.   Keep f/u with Dr. Chen.

## 2024-05-08 NOTE — ASSESSMENT & PLAN NOTE
Lincomycin 600mg/Decadron 4mg/Depo Medrol 40mg injections given. No adverse reaction noted.   Levaquin prescribed to take as directed. Instructed to take all abx until gone even if start to feel better.    Instructed to RTC for any new/worsening/persisting ssx.

## 2024-05-08 NOTE — ASSESSMENT & PLAN NOTE
Lincomycin 600mg/Decadron 4mg/Depo Medrol 40mg injections given. No adverse reaction noted.   Levaquin prescribed to take as directed. Instructed to take all abx until gone even if start to feel better.    Prednisone 2.5mg prescribed to take as directed for 4 weeks.   Continue home medications.  Instructed to RTC for any new/worsening/persisting ssx.

## 2024-05-09 ENCOUNTER — PATIENT OUTREACH (OUTPATIENT)
Dept: ADMINISTRATIVE | Facility: HOSPITAL | Age: 74
End: 2024-05-09

## 2024-05-09 NOTE — LETTER
AUTHORIZATION FOR RELEASE OF   CONFIDENTIAL INFORMATION    Dear [facility],    We are seeing Geoffrey Gleason, date of birth 1950, in the clinic at [facility]. Moon Shields FNP is the patient's PCP. Geoffrey Gleason has an outstanding lab/procedure at the time we reviewed his chart. In order to help keep his health information updated, he has authorized us to request the following medical record(s):        (  )  MAMMOGRAM                                      (  )  COLONOSCOPY      (  )  PAP SMEAR                                          (XX  )  OUTSIDE LAB RESULTS     (  )  DEXA SCAN                                          (  )  EYE EXAM            (  )  FOOT EXAM                                          (  )  ENTIRE RECORD     (  )  OUTSIDE IMMUNIZATIONS                 (XX MOST RECENT CLINIC NOTE TESTS         Please fax records to [facility], Cain, Sierra, FNP, [phone]     If you have any questions, please contact Adwoa Patricio at [phone].           Patient Name: Geoffrey Gleason  : 1950  Patient Phone #: [phone]

## 2024-05-09 NOTE — PROGRESS NOTES
Population Health Chart Review & Patient Outreach Details      Further Action Needed If Patient Returns Outreach:      24 REGINALDO to [facility] for recent lab work per pcp TC      Updates Requested / Reviewed:     [x]  Care Everywhere    [x]     []  External Sources ([facility], [facility], [facility], etc.)    [] [facility]   [] [facility]   [] Other:    [x]  Care Team Updated   []  Removed  or Duplicate Orders   []  Immunization Reconciliation Completed / Queried    [] [place]   [] [place]   [] [place]   [] [place]      Health Maintenance Topics Addressed and Outreach Outcomes / Actions Taken:             Breast Cancer Screening []  Mammogram Order Placed    []  Mammogram Screening Scheduled    []  External Records Requested & Care Team Updated if Applicable    []  External Records Uploaded & Care Team Updated if Applicable    []  Pt Declined Scheduling Mammogram    []  Pt Will Schedule with External Provider / Order Routed & Care Team Updated if Applicable              Cervical Cancer Screening []  Pap Smear Scheduled in Primary Care or OBGYN    []  External Records Requested & Care Team Updated if Applicable       []  External Records Uploaded, Care Team Updated, & History Updated if Applicable    []  Patient Declined Scheduling Pap Smear    []  Patient Will Schedule with External Provider & Care Team Updated if Applicable                  Colorectal Cancer Screening []  Colonoscopy Case Request / Referral / Home Test Order Placed    []  External Records Requested & Care Team Updated if Applicable    []  External Records Uploaded, Care Team Updated, & History Updated if Applicable    []  Patient Declined Completing Colon Cancer Screening    []  Patient Will Schedule with External Provider & Care Team Updated if Applicable    []  Fit Kit Mailed (add the SmartPhrase under additional notes)    []  Reminded Patient to Complete Home Test                Diabetic Eye Exam []  Eye Exam Screening Order Placed    []  Eye Camera  Scheduled or Optometry/Ophthalmology Referral Placed    []  External Records Requested & Care Team Updated if Applicable    []  External Records Uploaded, Care Team Updated, & History Updated if Applicable    []  Patient Declined Scheduling Eye Exam    []  Patient Will Schedule with External Provider & Care Team Updated if Applicable             Blood Pressure Control []  Primary Care Follow Up Visit Scheduled     []  Remote Blood Pressure Reading Captured    []  Patient Declined Remote Reading or Scheduling Appt - Escalated to PCP    []  Patient Will Call Back or Send Portal Message with Reading                 HbA1c & Other Labs []  Overdue Lab(s) Ordered    []  Overdue Lab(s) Scheduled    []  External Records Uploaded & Care Team Updated if Applicable    []  Primary Care Follow Up Visit Scheduled     []  Reminded Patient to Complete A1c Home Test    []  Patient Declined Scheduling Labs or Will Call Back to Schedule    []  Patient Will Schedule with External Provider / Order Routed, & Care Team Updated if Applicable           Primary Care Appointment []  Primary Care Appt Scheduled    []  Patient Declined Scheduling or Will Call Back to Schedule    []  Pt Established with External Provider, Updated Care Team, & Informed Pt to Notify Payor if Applicable           Medication Adherence /    Statin Use []  Primary Care Appointment Scheduled    []  Patient Reminded to  Prescription    []  Patient Declined, Provider Notified if Needed    []  Sent Provider Message to Review to Evaluate Pt for Statin, Add Exclusion Dx Codes, Document   Exclusion in Problem List, Change Statin Intensity Level to Moderate or High Intensity if Applicable                Osteoporosis Screening []  Dexa Order Placed    []  Dexa Appointment Scheduled    []  External Records Requested & Care Team Updated    []  External Records Uploaded, Care Team Updated, & History Updated if Applicable    []  Patient Declined Scheduling Dexa or Will Call  Back to Schedule    []  Patient Will Schedule with External Provider / Order Routed & Care Team Updated if Applicable       Additional Notes:

## 2024-05-28 ENCOUNTER — PATIENT OUTREACH (OUTPATIENT)
Facility: HOSPITAL | Age: 74
End: 2024-05-28
Payer: MEDICARE

## 2024-05-28 NOTE — PROGRESS NOTES
Population Health Chart Review & Patient Outreach Details      Further Action Needed If Patient Returns Outreach:        24 upload recent lab and aorta note from [facility] to  and health maintenance TC, Lpn-[facility]    Updates Requested / Reviewed:     []  Care Everywhere    []     []  External Sources ([facility], [facility], [facility], etc.)    [] [facility]   [] [facility]   [] Other:    []  Care Team Updated   []  Removed  or Duplicate Orders   []  Immunization Reconciliation Completed / Queried    [] [place]   [] [place]   [] [place]   [] [place]      Health Maintenance Topics Addressed and Outreach Outcomes / Actions Taken:             Breast Cancer Screening []  Mammogram Order Placed    []  Mammogram Screening Scheduled    []  External Records Requested & Care Team Updated if Applicable    []  External Records Uploaded & Care Team Updated if Applicable    []  Pt Declined Scheduling Mammogram    []  Pt Will Schedule with External Provider / Order Routed & Care Team Updated if Applicable              Cervical Cancer Screening []  Pap Smear Scheduled in Primary Care or OBGYN    []  External Records Requested & Care Team Updated if Applicable       []  External Records Uploaded, Care Team Updated, & History Updated if Applicable    []  Patient Declined Scheduling Pap Smear    []  Patient Will Schedule with External Provider & Care Team Updated if Applicable                  Colorectal Cancer Screening []  Colonoscopy Case Request / Referral / Home Test Order Placed    []  External Records Requested & Care Team Updated if Applicable    []  External Records Uploaded, Care Team Updated, & History Updated if Applicable    []  Patient Declined Completing Colon Cancer Screening    []  Patient Will Schedule with External Provider & Care Team Updated if Applicable    []  Fit Kit Mailed (add the SmartPhrase under additional notes)    []  Reminded Patient to Complete Home Test                Diabetic Eye Exam []   Eye Exam Screening Order Placed    []  Eye Camera Scheduled or Optometry/Ophthalmology Referral Placed    []  External Records Requested & Care Team Updated if Applicable    []  External Records Uploaded, Care Team Updated, & History Updated if Applicable    []  Patient Declined Scheduling Eye Exam    []  Patient Will Schedule with External Provider & Care Team Updated if Applicable             Blood Pressure Control []  Primary Care Follow Up Visit Scheduled     []  Remote Blood Pressure Reading Captured    []  Patient Declined Remote Reading or Scheduling Appt - Escalated to PCP    []  Patient Will Call Back or Send Portal Message with Reading                 HbA1c & Other Labs []  Overdue Lab(s) Ordered    []  Overdue Lab(s) Scheduled    []  External Records Uploaded & Care Team Updated if Applicable    []  Primary Care Follow Up Visit Scheduled     []  Reminded Patient to Complete A1c Home Test    []  Patient Declined Scheduling Labs or Will Call Back to Schedule    []  Patient Will Schedule with External Provider / Order Routed, & Care Team Updated if Applicable           Primary Care Appointment []  Primary Care Appt Scheduled    []  Patient Declined Scheduling or Will Call Back to Schedule    []  Pt Established with External Provider, Updated Care Team, & Informed Pt to Notify Payor if Applicable           Medication Adherence /    Statin Use []  Primary Care Appointment Scheduled    []  Patient Reminded to  Prescription    []  Patient Declined, Provider Notified if Needed    []  Sent Provider Message to Review to Evaluate Pt for Statin, Add Exclusion Dx Codes, Document   Exclusion in Problem List, Change Statin Intensity Level to Moderate or High Intensity if Applicable                Osteoporosis Screening []  Dexa Order Placed    []  Dexa Appointment Scheduled    []  External Records Requested & Care Team Updated    []  External Records Uploaded, Care Team Updated, & History Updated if Applicable     []  Patient Declined Scheduling Dexa or Will Call Back to Schedule    []  Patient Will Schedule with External Provider / Order Routed & Care Team Updated if Applicable       Additional Notes:

## 2024-06-17 ENCOUNTER — OFFICE VISIT (OUTPATIENT)
Dept: FAMILY MEDICINE | Facility: CLINIC | Age: 74
End: 2024-06-17
Payer: MEDICARE

## 2024-06-17 VITALS
TEMPERATURE: 99 F | SYSTOLIC BLOOD PRESSURE: 116 MMHG | RESPIRATION RATE: 24 BRPM | HEART RATE: 102 BPM | DIASTOLIC BLOOD PRESSURE: 65 MMHG | HEIGHT: 69 IN | BODY MASS INDEX: 24.5 KG/M2 | WEIGHT: 165.38 LBS | OXYGEN SATURATION: 94 %

## 2024-06-17 DIAGNOSIS — R10.9 LEFT FLANK PAIN: ICD-10-CM

## 2024-06-17 DIAGNOSIS — J44.1 CHRONIC OBSTRUCTIVE PULMONARY DISEASE WITH ACUTE EXACERBATION: Primary | ICD-10-CM

## 2024-06-17 DIAGNOSIS — J44.1 COPD EXACERBATION: ICD-10-CM

## 2024-06-17 DIAGNOSIS — R60.9 EDEMA, UNSPECIFIED TYPE: ICD-10-CM

## 2024-06-17 LAB
ANION GAP SERPL CALCULATED.3IONS-SCNC: 12 MMOL/L (ref 7–16)
BASOPHILS # BLD AUTO: 0.03 K/UL (ref 0–0.2)
BASOPHILS NFR BLD AUTO: 0.4 % (ref 0–1)
BILIRUB SERPL-MCNC: ABNORMAL MG/DL
BLOOD URINE, POC: ABNORMAL
BUN SERPL-MCNC: 9 MG/DL (ref 7–18)
BUN/CREAT SERPL: 15 (ref 6–20)
CALCIUM SERPL-MCNC: 8.5 MG/DL (ref 8.5–10.1)
CHLORIDE SERPL-SCNC: 94 MMOL/L (ref 98–107)
CO2 SERPL-SCNC: 33 MMOL/L (ref 21–32)
COLOR, POC UA: ABNORMAL
CREAT SERPL-MCNC: 0.62 MG/DL (ref 0.7–1.3)
DIFFERENTIAL METHOD BLD: ABNORMAL
EGFR (NO RACE VARIABLE) (RUSH/TITUS): 100 ML/MIN/1.73M2
EOSINOPHIL # BLD AUTO: 0.04 K/UL (ref 0–0.5)
EOSINOPHIL NFR BLD AUTO: 0.6 % (ref 1–4)
ERYTHROCYTE [DISTWIDTH] IN BLOOD BY AUTOMATED COUNT: 14.1 % (ref 11.5–14.5)
GLUCOSE SERPL-MCNC: 82 MG/DL (ref 74–106)
GLUCOSE UR QL STRIP: ABNORMAL
HCT VFR BLD AUTO: 51.5 % (ref 40–54)
HGB BLD-MCNC: 15.3 G/DL (ref 13.5–18)
IMM GRANULOCYTES # BLD AUTO: 0.03 K/UL (ref 0–0.04)
IMM GRANULOCYTES NFR BLD: 0.4 % (ref 0–0.4)
KETONES UR QL STRIP: ABNORMAL
LEUKOCYTE ESTERASE URINE, POC: ABNORMAL
LYMPHOCYTES # BLD AUTO: 0.53 K/UL (ref 1–4.8)
LYMPHOCYTES NFR BLD AUTO: 7.5 % (ref 27–41)
MCH RBC QN AUTO: 28.6 PG (ref 27–31)
MCHC RBC AUTO-ENTMCNC: 29.7 G/DL (ref 32–36)
MCV RBC AUTO: 96.3 FL (ref 80–96)
MONOCYTES # BLD AUTO: 0.58 K/UL (ref 0–0.8)
MONOCYTES NFR BLD AUTO: 8.2 % (ref 2–6)
MPC BLD CALC-MCNC: 9.9 FL (ref 9.4–12.4)
NEUTROPHILS # BLD AUTO: 5.88 K/UL (ref 1.8–7.7)
NEUTROPHILS NFR BLD AUTO: 82.9 % (ref 53–65)
NITRITE, POC UA: ABNORMAL
NRBC # BLD AUTO: 0 X10E3/UL
NRBC, AUTO (.00): 0 %
PH, POC UA: 5.5
PLATELET # BLD AUTO: 220 K/UL (ref 150–400)
POTASSIUM SERPL-SCNC: 3.7 MMOL/L (ref 3.5–5.1)
PROTEIN, POC: 30
RBC # BLD AUTO: 5.35 M/UL (ref 4.6–6.2)
SODIUM SERPL-SCNC: 135 MMOL/L (ref 136–145)
SPECIFIC GRAVITY, POC UA: >=1.03
UROBILINOGEN, POC UA: 0.2
WBC # BLD AUTO: 7.09 K/UL (ref 4.5–11)

## 2024-06-17 PROCEDURE — 80048 BASIC METABOLIC PNL TOTAL CA: CPT | Mod: ,,, | Performed by: CLINICAL MEDICAL LABORATORY

## 2024-06-17 PROCEDURE — 96372 THER/PROPH/DIAG INJ SC/IM: CPT | Mod: ,,, | Performed by: NURSE PRACTITIONER

## 2024-06-17 PROCEDURE — 99214 OFFICE O/P EST MOD 30 MIN: CPT | Mod: ,,, | Performed by: NURSE PRACTITIONER

## 2024-06-17 PROCEDURE — 81003 URINALYSIS AUTO W/O SCOPE: CPT | Mod: RHCUB | Performed by: NURSE PRACTITIONER

## 2024-06-17 PROCEDURE — 85025 COMPLETE CBC W/AUTO DIFF WBC: CPT | Mod: ,,, | Performed by: CLINICAL MEDICAL LABORATORY

## 2024-06-17 RX ORDER — LINCOMYCIN HYDROCHLORIDE 300 MG/ML
600 INJECTION, SOLUTION INTRAMUSCULAR; INTRAVENOUS; SUBCONJUNCTIVAL
Status: COMPLETED | OUTPATIENT
Start: 2024-06-17 | End: 2024-06-17

## 2024-06-17 RX ORDER — OLOPATADINE HYDROCHLORIDE 665 UG/1
SPRAY NASAL
COMMUNITY

## 2024-06-17 RX ORDER — FLUTICASONE PROPIONATE 50 MCG
SPRAY, SUSPENSION (ML) NASAL
COMMUNITY

## 2024-06-17 RX ORDER — PREDNISONE 20 MG/1
TABLET ORAL
COMMUNITY
Start: 2024-05-29 | End: 2024-06-17

## 2024-06-17 RX ORDER — NAPROXEN SODIUM 220 MG/1
81 TABLET, FILM COATED ORAL DAILY
COMMUNITY

## 2024-06-17 RX ORDER — CETIRIZINE HYDROCHLORIDE 10 MG/1
TABLET ORAL
COMMUNITY

## 2024-06-17 RX ORDER — DEXAMETHASONE SODIUM PHOSPHATE 4 MG/ML
4 INJECTION, SOLUTION INTRA-ARTICULAR; INTRALESIONAL; INTRAMUSCULAR; INTRAVENOUS; SOFT TISSUE
Status: COMPLETED | OUTPATIENT
Start: 2024-06-17 | End: 2024-06-17

## 2024-06-17 RX ORDER — ARFORMOTEROL TARTRATE 15 UG/2ML
SOLUTION RESPIRATORY (INHALATION)
COMMUNITY

## 2024-06-17 RX ORDER — METHYLPREDNISOLONE ACETATE 40 MG/ML
40 INJECTION, SUSPENSION INTRA-ARTICULAR; INTRALESIONAL; INTRAMUSCULAR; SOFT TISSUE
Status: COMPLETED | OUTPATIENT
Start: 2024-06-17 | End: 2024-06-17

## 2024-06-17 RX ORDER — LEVOFLOXACIN 500 MG/1
500 TABLET, FILM COATED ORAL DAILY
Qty: 5 TABLET | Refills: 0 | Status: SHIPPED | OUTPATIENT
Start: 2024-06-17

## 2024-06-17 RX ADMIN — LINCOMYCIN HYDROCHLORIDE 600 MG: 300 INJECTION, SOLUTION INTRAMUSCULAR; INTRAVENOUS; SUBCONJUNCTIVAL at 03:06

## 2024-06-17 RX ADMIN — DEXAMETHASONE SODIUM PHOSPHATE 4 MG: 4 INJECTION, SOLUTION INTRA-ARTICULAR; INTRALESIONAL; INTRAMUSCULAR; INTRAVENOUS; SOFT TISSUE at 03:06

## 2024-06-17 RX ADMIN — METHYLPREDNISOLONE ACETATE 40 MG: 40 INJECTION, SUSPENSION INTRA-ARTICULAR; INTRALESIONAL; INTRAMUSCULAR; SOFT TISSUE at 03:06

## 2024-06-17 NOTE — PROGRESS NOTES
LELAND Elizabeth   [facility]  [address]  [place] [address]     PATIENT NAME: Geoffrey Gleason  : 1950  DATE: 24  MRN: [id number]      Billing Provider: LELAND Elizabeth  Level of Service:   Patient PCP Information       Provider PCP Type    LELAND Elizabeth [facility]            Reason for Visit / Chief Complaint: Flank Pain (Left side flank pain started Friday. )   Health Maintenance Due   Topic Date Due    Hepatitis C Screening  Never done    Shingles Vaccine (1 of 2) Never done    RSV Vaccine (Age 60+ and Pregnant patients) (1 - 1-dose 60+ series) Never done    LDCT Lung Screen  2022    DEXA Scan  2023          Update PCP  Update Chief Complaint         History of Present Illness / Problem Focused Workflow     Geoffrey Gleason presents to the clinic with same symptoms as before with cough, sob. States cough is productive with thick, white sputum. O2 at start of visit is 53%. O2 applied at 3L per NC and O2 comes up to 94%. He states he is having left sided flank pain along with this since Friday. He was taking Prednisone 2.5mg daily from last visit. He states he saw Pulmonologist Dr. Chen's NP approx 2 weeks ago and she increased 20mg every other day. He states this was too much as he started to have adverse effects such as stomach pain. He states he just quit taking steroid altogether. He states pain is more on the left lower flank but similar to the pain he has had before. He did have cxr done there which he states he was told was the same as usual. He is more hoarse today. He states he has not been on any steroids in the past week. He still declines HH at this time. He only sees Pulmonologist every 6 months and denies any other med changes. He still has plenty of inhaler/breathing treatments that he is taking. He still does not use oxygen much during the day, informed he needs to using his oxygen especially when his O2 is low like it is now. Pt vu. He denies fever,  "headaches, earaches, abd pain, n/v/d. He denies problems with urinating/bowels. He denies any other needs at this time. NAD noted.     No results found for: "HGBA1C"     CMP  Sodium   Date Value Ref Range Status   04/08/2024 134 (L) 136 - 145 mmol/L Final     Potassium   Date Value Ref Range Status   04/08/2024 3.8 3.5 - 5.1 mmol/L Final     Chloride   Date Value Ref Range Status   04/08/2024 92 (L) 98 - 107 mmol/L Final     CO2   Date Value Ref Range Status   04/08/2024 38 (H) 21 - 32 mmol/L Final     Glucose   Date Value Ref Range Status   04/08/2024 120 (H) 74 - 106 mg/dL Final     BUN   Date Value Ref Range Status   04/08/2024 10 7 - 18 mg/dL Final     Creatinine   Date Value Ref Range Status   04/08/2024 0.61 (L) 0.70 - 1.30 mg/dL Final     Calcium   Date Value Ref Range Status   04/08/2024 9.0 8.5 - 10.1 mg/dL Final     Total Protein   Date Value Ref Range Status   04/08/2024 6.6 6.4 - 8.2 g/dL Final     Albumin   Date Value Ref Range Status   04/08/2024 3.3 (L) 3.5 - 5.0 g/dL Final     Bilirubin, Total   Date Value Ref Range Status   04/08/2024 0.9 >0.0 - 1.2 mg/dL Final     Alk Phos   Date Value Ref Range Status   04/08/2024 76 45 - 115 U/L Final     AST   Date Value Ref Range Status   04/08/2024 10 (L) 15 - 37 U/L Final     ALT   Date Value Ref Range Status   04/08/2024 29 16 - 61 U/L Final     Anion Gap   Date Value Ref Range Status   04/08/2024 8 7 - 16 mmol/L Final     eGFR   Date Value Ref Range Status   04/08/2024 101 >=60 mL/min/1.73m2 Final        Lab Results   Component Value Date    WBC 7.70 04/08/2024    RBC 5.78 04/08/2024    HGB 15.8 04/08/2024    HCT 51.9 04/08/2024    MCV 89.8 04/08/2024    MCH 27.3 04/08/2024    MCHC 30.4 (L) 04/08/2024    RDW 13.9 04/08/2024     04/08/2024    MPV 10.0 04/08/2024    LYMPH 17.8 (L) 04/08/2024    LYMPH 1.37 04/08/2024    MONO 5.5 04/08/2024    EOS 0.19 04/08/2024    BASO 0.05 04/08/2024    EOSINOPHIL 2.5 04/08/2024    BASOPHIL 0.6 04/08/2024        No " "results found for: "CHOL"  No results found for: "HDL"  No results found for: "LDLCALC"  No results found for: "TRIG"  No results found for: "CHOLHDL"     Wt Readings from Last 3 Encounters:   06/17/24 1426 75 kg (165 lb 6.4 oz)   05/08/24 0947 74.3 kg (163 lb 12.8 oz)   04/08/24 1310 70.3 kg (155 lb)        BP Readings from Last 3 Encounters:   06/17/24 116/65   05/08/24 138/65   04/08/24 (!) 148/75        Review of Systems     Review of Systems   Constitutional: Negative.    HENT:  Positive for voice change.    Eyes: Negative.    Respiratory:  Positive for cough, shortness of breath and wheezing. Negative for apnea.    Cardiovascular: Negative.    Gastrointestinal: Negative.    Endocrine: Negative.    Genitourinary: Negative.    Musculoskeletal:  Positive for arthralgias.   Integumentary:  Negative.   Allergic/Immunologic: Negative.    Neurological: Negative.    Hematological: Negative.    Psychiatric/Behavioral: Negative.          Medical / Social / Family History     Past Medical History:   Diagnosis Date    Cellulitis 04/14/2022    COPD (chronic obstructive pulmonary disease)     COPD with acute exacerbation 11/28/2023    Left otitis media 04/01/2024    Pneumonia of left lower lobe due to infectious organism 04/12/2024    Shortness of breath 03/03/2022    Swelling of both ankles 04/14/2022    Swelling of both lower extremities 04/18/2022       History reviewed. No pertinent surgical history.    Social History    reports that he has been smoking cigarettes. He started smoking about 56 years ago. He has a 56.5 pack-year smoking history. He has never used smokeless tobacco. He reports that he does not currently use alcohol. He reports that he does not use drugs.    Family History  's family history is not on file.    Medications and Allergies     Medications  Outpatient Medications Marked as Taking for the 6/17/24 encounter (Office Visit) with Moon Shields FNP   Medication Sig Dispense Refill    " arformoteroL (BROVANA) 15 mcg/2 mL Nebu 2 ml Inhalation Twice a day      aspirin 81 MG Chew Take 81 mg by mouth once daily.      budesonide (PULMICORT) 0.5 mg/2 mL nebulizer solution 2 ml      cetirizine (ZYRTEC) 10 MG tablet 1 tablet Orally Once a day for 30 days      ciclopirox (LOPROX) 0.77 % Crea Apply topically 2 (two) times daily.      DALIRESP 500 mcg Tab Take 1 tablet by mouth once daily.      fluticasone propionate (FLONASE ALLERGY RELIEF) 50 mcg/actuation nasal spray 1 spray in each nostril Nasally Once a day for 30 days      gabapentin (NEURONTIN) 300 MG capsule Take 1 capsule (300 mg total) by mouth every evening. 90 capsule 1    ketoconazole (NIZORAL) 2 % shampoo SMARTSIG:Topical 2-3 Times Weekly      levalbuterol (XOPENEX HFA) 45 mcg/actuation inhaler SMARTSI Puff(s) By Mouth Every 6 Hours PRN      levalbuterol (XOPENEX) 1.25 mg/3 mL nebulizer solution USE ONE vial via NEBULIZER EVERY 8 HOURS AS NEEDED      OXYGEN-AIR DELIVERY SYSTEMS MISC 2 L by Nasal route Daily.      tamsulosin (FLOMAX) 0.4 mg Cap Take 1 capsule (0.4 mg total) by mouth once daily. 90 capsule 3     Current Facility-Administered Medications for the 24 encounter (Office Visit) with Moon Shields FNP   Medication Dose Route Frequency Provider Last Rate Last Admin    [COMPLETED] dexAMETHasone injection 4 mg  4 mg Intramuscular 1 time in Clinic/HOD Moon Shields FNP   4 mg at 24 1521    [COMPLETED] lincomycin injection 600 mg  600 mg Intramuscular 1 time in Clinic/HOD Moon Shields FNP   600 mg at 24 1522    [COMPLETED] methylPREDNISolone acetate injection 40 mg  40 mg Intramuscular 1 time in Clinic/HOD Moon Shields FNP   40 mg at 24 1522       Allergies  Review of patient's allergies indicates:   Allergen Reactions    Cephalexin Hives and Other (See Comments)     Swelling SOB      Amoxicillin Rash       Physical Examination     Vitals:    24 1426 24 1502   BP: 116/65    BP Location: Left arm   "  Patient Position: Sitting    BP Method: Medium (Automatic)    Pulse: 102    Resp: (!) 24    Temp: 98.5 °F (36.9 °C)    TempSrc: Oral    SpO2: (!) 53% (!) 94%   Weight: 75 kg (165 lb 6.4 oz)    Height: 5' 9" (1.753 m)       Physical Exam  Constitutional:       Appearance: Normal appearance.   HENT:      Head: Normocephalic.      Right Ear: Hearing, ear canal and external ear normal. A middle ear effusion is present.      Left Ear: Hearing, ear canal and external ear normal. A middle ear effusion is present. Tympanic membrane is erythematous.      Ears:      Comments: L TM does have some erythema noted, but pt denies pain     Nose: Nose normal.      Mouth/Throat:      Mouth: Mucous membranes are moist.      Pharynx: Oropharynx is clear.   Eyes:      Extraocular Movements: Extraocular movements intact.   Cardiovascular:      Rate and Rhythm: Normal rate and regular rhythm.      Pulses: Normal pulses.      Heart sounds: Normal heart sounds.   Pulmonary:      Effort: Pulmonary effort is normal.      Breath sounds: No stridor, decreased air movement or transmitted upper airway sounds. Examination of the right-upper field reveals wheezing. Examination of the left-upper field reveals wheezing. Examination of the right-middle field reveals wheezing. Examination of the left-middle field reveals wheezing. Examination of the right-lower field reveals wheezing. Examination of the left-lower field reveals wheezing. Wheezing present. No decreased breath sounds.   Skin:     General: Skin is warm and dry.      Capillary Refill: Capillary refill takes less than 2 seconds.   Neurological:      General: No focal deficit present.      Mental Status: He is alert and oriented to person, place, and time.   Psychiatric:         Mood and Affect: Mood normal.         Behavior: Behavior normal.          Assessment and Plan (including Health Maintenance)      Problem List  Smart Sets  Document Outside HM   :    Plan:     There are no Patient " Instructions on file for this visit.       Health Maintenance Due   Topic Date Due    Hepatitis C Screening  Never done    Shingles Vaccine (1 of 2) Never done    RSV Vaccine (Age 60+ and Pregnant patients) (1 - 1-dose 60+ series) Never done    LDCT Lung Screen  11/01/2022    DEXA Scan  09/21/2023       Problem List Items Addressed This Visit       Chronic obstructive pulmonary disease - Primary    Current Assessment & Plan     See above.          Relevant Medications    lincomycin injection 600 mg (Completed)    dexAMETHasone injection 4 mg (Completed)    methylPREDNISolone acetate injection 40 mg (Completed)    COPD exacerbation    Current Assessment & Plan     Lincomycin 600mg/Decadron 4mg/Depo Medrol 40mg injections given. No adverse reaction noted.   Levaquin prescribed to take as directed. Instructed to take all abx until gone even if start to feel better.    Continue Prednisone 2.5mg daily. Pt states he still has a couple of weeks of this left.  Continue home medications.  Instructed to RTC for any new/worsening/persisting ssx.     Declines home health at this time. Informed pt home health could come out and help monitor vitals and inform provider with any worsening conditions. Pt vu but declines at this time.   Keep f/u with Dr. Chen.          Relevant Medications    levoFLOXacin (LEVAQUIN) 500 MG tablet    Other Relevant Orders    CBC Auto Differential    Basic Metabolic Panel    Left flank pain    Current Assessment & Plan     Pt with no hx of kidney stones.   Labs pending. Will inform of results when available.          Relevant Orders    POCT URINALYSIS W/O SCOPE (Completed)     Chronic obstructive pulmonary disease with acute exacerbation  -     lincomycin injection 600 mg  -     dexAMETHasone injection 4 mg  -     methylPREDNISolone acetate injection 40 mg    COPD exacerbation  -     levoFLOXacin (LEVAQUIN) 500 MG tablet; Take 1 tablet (500 mg total) by mouth once daily.  Dispense: 5 tablet;  Refill: 0  -     CBC Auto Differential; Future; Expected date: 06/17/2024  -     Basic Metabolic Panel; Future; Expected date: 06/17/2024    Left flank pain  -     POCT URINALYSIS W/O SCOPE       Health Maintenance Topics with due status: Not Due       Topic Last Completion Date    Colorectal Cancer Screening 02/05/2016    TETANUS VACCINE 02/15/2020    Lipid Panel 05/20/2022         No future appointments.     No follow-ups on file.    Health Maintenance Due   Topic Date Due    Hepatitis C Screening  Never done    Shingles Vaccine (1 of 2) Never done    RSV Vaccine (Age 60+ and Pregnant patients) (1 - 1-dose 60+ series) Never done    LDCT Lung Screen  11/01/2022    DEXA Scan  09/21/2023        Signature:  LELAND Elizabeth    Date of encounter: 6/17/24

## 2024-06-17 NOTE — ASSESSMENT & PLAN NOTE
Lincomycin 600mg/Decadron 4mg/Depo Medrol 40mg injections given. No adverse reaction noted.   Levaquin prescribed to take as directed. Instructed to take all abx until gone even if start to feel better.    Continue Prednisone 2.5mg daily. Pt states he still has a couple of weeks of this left.  Continue home medications.  Instructed to RTC for any new/worsening/persisting ssx.     Declines home health at this time. Informed pt home health could come out and help monitor vitals and inform provider with any worsening conditions. Pt vu but declines at this time.   Keep f/u with Dr. Chen.

## 2024-06-18 ENCOUNTER — TELEPHONE (OUTPATIENT)
Dept: FAMILY MEDICINE | Facility: CLINIC | Age: 74
End: 2024-06-18
Payer: MEDICARE

## 2024-06-18 DIAGNOSIS — R60.9 EDEMA, UNSPECIFIED TYPE: Primary | ICD-10-CM

## 2024-06-18 LAB — NT-PROBNP SERPL-MCNC: 1240 PG/ML (ref 1–450)

## 2024-06-18 PROCEDURE — 83880 ASSAY OF NATRIURETIC PEPTIDE: CPT | Mod: ,,, | Performed by: CLINICAL MEDICAL LABORATORY

## 2024-06-18 RX ORDER — FUROSEMIDE 20 MG/1
20 TABLET ORAL DAILY
Qty: 30 TABLET | Refills: 0 | Status: SHIPPED | OUTPATIENT
Start: 2024-06-18 | End: 2025-06-18

## 2024-06-18 NOTE — PROGRESS NOTES
Please check to see how pt is doing. Please let pt know labs are stable. WBC not elevated which could indicate  a severe bacterial infection such as pneumonia. Thanks!

## 2024-06-18 NOTE — TELEPHONE ENCOUNTER
Spoke with Mr Victorino and let him know we would call in Lasix 20mg to take daily as needed for leg swelling. Also told pt he needs to drink plenty of water with this medication so he does not become dehydrated. Pt voiced understanding

## 2024-08-09 DIAGNOSIS — Z71.89 COMPLEX CARE COORDINATION: ICD-10-CM

## 2024-09-12 ENCOUNTER — OFFICE VISIT (OUTPATIENT)
Dept: FAMILY MEDICINE | Facility: CLINIC | Age: 74
End: 2024-09-12
Payer: MEDICARE

## 2024-09-12 VITALS
BODY MASS INDEX: 23.05 KG/M2 | RESPIRATION RATE: 20 BRPM | TEMPERATURE: 98 F | HEART RATE: 87 BPM | WEIGHT: 155.63 LBS | SYSTOLIC BLOOD PRESSURE: 127 MMHG | HEIGHT: 69 IN | DIASTOLIC BLOOD PRESSURE: 72 MMHG | OXYGEN SATURATION: 68 %

## 2024-09-12 DIAGNOSIS — J96.11 CHRONIC RESPIRATORY FAILURE WITH HYPOXIA, ON HOME O2 THERAPY: ICD-10-CM

## 2024-09-12 DIAGNOSIS — Z99.81 CHRONIC RESPIRATORY FAILURE WITH HYPOXIA, ON HOME O2 THERAPY: ICD-10-CM

## 2024-09-12 DIAGNOSIS — R60.9 EDEMA, UNSPECIFIED TYPE: ICD-10-CM

## 2024-09-12 DIAGNOSIS — I50.9 CHRONIC CONGESTIVE HEART FAILURE, UNSPECIFIED HEART FAILURE TYPE: Primary | ICD-10-CM

## 2024-09-12 DIAGNOSIS — H65.03 NON-RECURRENT ACUTE SEROUS OTITIS MEDIA OF BOTH EARS: ICD-10-CM

## 2024-09-12 PROBLEM — R10.9 LEFT FLANK PAIN: Status: RESOLVED | Noted: 2024-06-17 | Resolved: 2024-09-12

## 2024-09-12 PROBLEM — H65.91 MIDDLE EAR EFFUSION, RIGHT: Status: RESOLVED | Noted: 2024-05-08 | Resolved: 2024-09-12

## 2024-09-12 RX ORDER — METOLAZONE 5 MG/1
TABLET ORAL
COMMUNITY
Start: 2024-07-02

## 2024-09-12 RX ORDER — DOXYCYCLINE 100 MG/1
100 CAPSULE ORAL EVERY 12 HOURS
Qty: 20 CAPSULE | Refills: 0 | Status: SHIPPED | OUTPATIENT
Start: 2024-09-12 | End: 2024-09-22

## 2024-09-12 RX ORDER — FUROSEMIDE 20 MG/1
20 TABLET ORAL DAILY
Qty: 90 TABLET | Refills: 1 | Status: SHIPPED | OUTPATIENT
Start: 2024-09-12 | End: 2025-09-12

## 2024-09-12 RX ORDER — LINCOMYCIN HYDROCHLORIDE 300 MG/ML
600 INJECTION, SOLUTION INTRAMUSCULAR; INTRAVENOUS; SUBCONJUNCTIVAL
Status: COMPLETED | OUTPATIENT
Start: 2024-09-12 | End: 2024-09-12

## 2024-09-12 RX ADMIN — LINCOMYCIN HYDROCHLORIDE 600 MG: 300 INJECTION, SOLUTION INTRAMUSCULAR; INTRAVENOUS; SUBCONJUNCTIVAL at 01:09

## 2024-09-12 NOTE — ASSESSMENT & PLAN NOTE
Lincomycin 600mg given. No adverse reaction noted.   Doxycycline prescribed to take as directed. Instructed to take all abx until gone even if start to feel better.    Instructed may alternate Tylenol/Motrin for pain/fever if not contraindicated.    Instructed to RTC for any new/worsening/persisting ssx.

## 2024-09-12 NOTE — ASSESSMENT & PLAN NOTE
"Instructed to increase lasix back up to 40mg daily at least for 3 days, today through Saturday, then may resume at previous dose. Call provider Monday if not better.   Pt still declines HH and provider tried to expalin benefits of having HH that would could help him and monitor breathing/heart conditions but still declines. Pt states he has a dog that would bark the whole time even if putting it in a room and it is very protective of him. Instructed pt and wife if breathing gets worse to go to ER. Wife vu but states "you know he won't do that either".  Instructed to weight every morning, at same time, with same amount of clothes as this is the most accurate way to tell if gaining fluid.   Elevate lower extremities.     "

## 2024-09-12 NOTE — PROGRESS NOTES
"   LELAND Elizabeth   [facility]  [address]  [place] [address]     PATIENT NAME: Geoffrey Gleason  : 1950  DATE: 24  MRN: [id number]      Billing Provider: LELAND Elizabeth  Level of Service:   Patient PCP Information       Provider PCP Type    LELAND Elizabeth [facility]            Reason for Visit / Chief Complaint: COPD (Pt is here w c/o of sob bc of copd. He said this has been going on with this episode about two weeks. Pt is still smoking. O2 has been running in the 50's.)   Health Maintenance Due   Topic Date Due    Hepatitis C Screening  Never done    Shingles Vaccine (1 of 2) Never done    RSV Vaccine (Age 60+ and Pregnant patients) (1 - 1-dose 60+ series) Never done    LDCT Lung Screen  2022    DEXA Scan  2023    Influenza Vaccine (1) 2024          Update PCP  Update Chief Complaint         History of Present Illness / Problem Focused Workflow     Geoffrey Gleason presents to the clinic with wife with increase sob for the last 2 weeks. O2 sat in clinic is 68%. Pt is in no acute distress and does not have any difficulty breathing currently. Pt states O2 at home has been in the "50s" without his home O2. Wife states pt has been taking his O2 more at home. Pt does have COPD and CHF. Pt saw Dr. Chen and Dr. Treviño recently. Dr. Chen did add Zaroxolyn 5mg daily to medication regimen on last visit in July to take along with his Lasix. On med list at Dr. Chen's office, it has pt taking lasix 40mg one tab in am and 1/2 tab in evening. Pt has been taking 20mg daily due to "losing weight" and wife said pt done this himself. Pt states he did have cxr also at last visit with no changes. Pt does have swelling to lower extremities today. Provider asked pt if he weighs himself and states he will occasionally, but not every day. Pt states he has been taking his inhalers as directed and wife states "yeah he has been taking them cigarettes" too. Wife states pt " "smokes a pack per day. He does have productive cough with thick, white, tan sputum that is chronic. He denies any other cold symptoms such as fever, headaches, sore throat, earaches, abd pain, n/v/d, rash. He denies any other needs at this time. NAD noted.     No results found for: "HGBA1C"     CMP  Sodium   Date Value Ref Range Status   06/17/2024 135 (L) 136 - 145 mmol/L Final     Potassium   Date Value Ref Range Status   06/17/2024 3.7 3.5 - 5.1 mmol/L Final     Chloride   Date Value Ref Range Status   06/17/2024 94 (L) 98 - 107 mmol/L Final     CO2   Date Value Ref Range Status   06/17/2024 33 (H) 21 - 32 mmol/L Final     Glucose   Date Value Ref Range Status   06/17/2024 82 74 - 106 mg/dL Final     BUN   Date Value Ref Range Status   06/17/2024 9 7 - 18 mg/dL Final     Creatinine   Date Value Ref Range Status   06/17/2024 0.62 (L) 0.70 - 1.30 mg/dL Final     Calcium   Date Value Ref Range Status   06/17/2024 8.5 8.5 - 10.1 mg/dL Final     Total Protein   Date Value Ref Range Status   04/08/2024 6.6 6.4 - 8.2 g/dL Final     Albumin   Date Value Ref Range Status   04/08/2024 3.3 (L) 3.5 - 5.0 g/dL Final     Bilirubin, Total   Date Value Ref Range Status   04/08/2024 0.9 >0.0 - 1.2 mg/dL Final     Alk Phos   Date Value Ref Range Status   04/08/2024 76 45 - 115 U/L Final     AST   Date Value Ref Range Status   04/08/2024 10 (L) 15 - 37 U/L Final     ALT   Date Value Ref Range Status   04/08/2024 29 16 - 61 U/L Final     Anion Gap   Date Value Ref Range Status   06/17/2024 12 7 - 16 mmol/L Final     eGFR   Date Value Ref Range Status   06/17/2024 100 >=60 mL/min/1.73m2 Final        Lab Results   Component Value Date    WBC 7.09 06/17/2024    RBC 5.35 06/17/2024    HGB 15.3 06/17/2024    HCT 51.5 06/17/2024    MCV 96.3 (H) 06/17/2024    MCH 28.6 06/17/2024    MCHC 29.7 (L) 06/17/2024    RDW 14.1 06/17/2024     06/17/2024    MPV 9.9 06/17/2024    LYMPH 7.5 (L) 06/17/2024    LYMPH 0.53 (L) 06/17/2024    MONO 8.2 " "(H) 06/17/2024    EOS 0.04 06/17/2024    BASO 0.03 06/17/2024    EOSINOPHIL 0.6 (L) 06/17/2024    BASOPHIL 0.4 06/17/2024        No results found for: "CHOL"  No results found for: "HDL"  No results found for: "LDLCALC"  No results found for: "TRIG"  No results found for: "CHOLHDL"     Wt Readings from Last 3 Encounters:   09/12/24 1318 70.6 kg (155 lb 9.6 oz)   06/17/24 1426 75 kg (165 lb 6.4 oz)   05/08/24 0947 74.3 kg (163 lb 12.8 oz)        BP Readings from Last 3 Encounters:   09/12/24 127/72   06/17/24 116/65   05/08/24 138/65        Review of Systems     Review of Systems   Constitutional: Negative.    Eyes: Negative.    Respiratory:  Positive for cough and shortness of breath.    Cardiovascular:  Positive for leg swelling.   Gastrointestinal: Negative.    Endocrine: Negative.    Genitourinary: Negative.    Musculoskeletal: Negative.    Integumentary:  Negative.   Allergic/Immunologic: Negative.    Neurological: Negative.    Hematological: Negative.    Psychiatric/Behavioral: Negative.          Medical / Social / Family History     Past Medical History:   Diagnosis Date    Cellulitis 04/14/2022    COPD (chronic obstructive pulmonary disease)     COPD with acute exacerbation 11/28/2023    Left flank pain 06/17/2024    Left otitis media 04/01/2024    Middle ear effusion, right 05/08/2024    Pneumonia of left lower lobe due to infectious organism 04/12/2024    Shortness of breath 03/03/2022    Swelling of both ankles 04/14/2022    Swelling of both lower extremities 04/18/2022       History reviewed. No pertinent surgical history.    Social History    reports that he has been smoking cigarettes. He started smoking about 56 years ago. He has a 56.7 pack-year smoking history. He has never used smokeless tobacco. He reports that he does not currently use alcohol. He reports that he does not use drugs.    Family History  's family history is not on file.    Medications and Allergies     Medications  Outpatient " Medications Marked as Taking for the 24 encounter (Office Visit) with Moon Shields FNP   Medication Sig Dispense Refill    arformoteroL (BROVANA) 15 mcg/2 mL Nebu 2 ml Inhalation Twice a day      aspirin 81 MG Chew Take 81 mg by mouth once daily.      budesonide (PULMICORT) 0.5 mg/2 mL nebulizer solution 2 ml      cetirizine (ZYRTEC) 10 MG tablet 1 tablet Orally Once a day for 30 days      ciclopirox (LOPROX) 0.77 % Crea Apply topically 2 (two) times daily.      DALIRESP 500 mcg Tab Take 1 tablet by mouth once daily.      fluticasone propionate (FLONASE ALLERGY RELIEF) 50 mcg/actuation nasal spray 1 spray in each nostril Nasally Once a day for 30 days      gabapentin (NEURONTIN) 300 MG capsule Take 1 capsule (300 mg total) by mouth every evening. 90 capsule 1    ketoconazole (NIZORAL) 2 % shampoo SMARTSIG:Topical 2-3 Times Weekly      levalbuterol (XOPENEX HFA) 45 mcg/actuation inhaler SMARTSI Puff(s) By Mouth Every 6 Hours PRN      levalbuterol (XOPENEX) 1.25 mg/3 mL nebulizer solution USE ONE vial via NEBULIZER EVERY 8 HOURS AS NEEDED      levoFLOXacin (LEVAQUIN) 500 MG tablet Take 1 tablet (500 mg total) by mouth once daily. 5 tablet 0    metOLazone (ZAROXOLYN) 5 MG tablet 1 tablet Orally Once a day for 30 day(s)      OXYGEN-AIR DELIVERY SYSTEMS MISC 2 L by Nasal route Daily.      tamsulosin (FLOMAX) 0.4 mg Cap Take 1 capsule (0.4 mg total) by mouth once daily. 90 capsule 3    [DISCONTINUED] furosemide (LASIX) 20 MG tablet Take 1 tablet (20 mg total) by mouth once daily. As needed for swelling 30 tablet 0     Current Facility-Administered Medications for the 24 encounter (Office Visit) with Moon Shields FNP   Medication Dose Route Frequency Provider Last Rate Last Admin    [COMPLETED] lincomycin injection 600 mg  600 mg Intramuscular 1 time in Clinic/HOD Moon Shields FNP   600 mg at 24 1349       Allergies  Review of patient's allergies indicates:   Allergen Reactions    Cephalexin Hives and  "Other (See Comments)     Swelling SOB      Amoxicillin Rash       Physical Examination     Vitals:    24 1318   BP: 127/72   BP Location: Right arm   Patient Position: Sitting   BP Method: Medium (Automatic)   Pulse: 87   Resp: 20   Temp: 98 °F (36.7 °C)   TempSrc: Oral   SpO2: (!) 68%   Weight: 70.6 kg (155 lb 9.6 oz)   Height: 5' 9" (1.753 m)      Physical Exam  Constitutional:       Appearance: Normal appearance.   HENT:      Head: Normocephalic.      Right Ear: Hearing, ear canal and external ear normal. Tympanic membrane is erythematous.      Left Ear: Hearing, ear canal and external ear normal. Tympanic membrane is erythematous.      Nose: Nose normal.      Mouth/Throat:      Mouth: Mucous membranes are moist.      Pharynx: Oropharynx is clear.   Eyes:      Extraocular Movements: Extraocular movements intact.      Pupils: Pupils are equal, round, and reactive to light.   Cardiovascular:      Rate and Rhythm: Normal rate and regular rhythm.      Pulses: Normal pulses.      Heart sounds: Normal heart sounds.   Pulmonary:      Effort: Pulmonary effort is normal. No tachypnea, bradypnea, accessory muscle usage, prolonged expiration, respiratory distress or retractions.      Breath sounds: Normal breath sounds. No stridor, decreased air movement or transmitted upper airway sounds. No decreased breath sounds, wheezing, rhonchi or rales.   Abdominal:      General: Abdomen is flat. Bowel sounds are normal.      Palpations: Abdomen is soft.   Musculoskeletal:      Right lower le+ Edema present.      Left lower le+ Edema present.   Skin:     General: Skin is warm and dry.      Capillary Refill: Capillary refill takes less than 2 seconds.   Neurological:      General: No focal deficit present.      Mental Status: He is alert and oriented to person, place, and time.   Psychiatric:         Mood and Affect: Mood normal.         Behavior: Behavior normal.          Assessment and Plan (including Health " "Maintenance)      Problem List  Smart Sets  Document Outside HM   :    Plan:     There are no Patient Instructions on file for this visit.       Health Maintenance Due   Topic Date Due    Hepatitis C Screening  Never done    Shingles Vaccine (1 of 2) Never done    RSV Vaccine (Age 60+ and Pregnant patients) (1 - 1-dose 60+ series) Never done    LDCT Lung Screen  11/01/2022    DEXA Scan  09/21/2023    Influenza Vaccine (1) 09/01/2024       Problem List Items Addressed This Visit       Chronic congestive heart failure - Primary    Current Assessment & Plan     Instructed to increase lasix back up to 40mg daily at least for 3 days, today through Saturday, then may resume at previous dose. Call provider Monday if not better.   Pt still declines HH and provider tried to expalin benefits of having HH that would could help him and monitor breathing/heart conditions but still declines. Pt states he has a dog that would bark the whole time even if putting it in a room and it is very protective of him. Instructed pt and wife if breathing gets worse to go to ER. Wife vu but states "you know he won't do that either".  Instructed to weight every morning, at same time, with same amount of clothes as this is the most accurate way to tell if gaining fluid.   Elevate lower extremities.            Chronic respiratory failure with hypoxia, on home O2 therapy    Current Assessment & Plan     Instructed pt to continue home o2.          Edema    Current Assessment & Plan     See CHF plan.          Relevant Medications    furosemide (LASIX) 20 MG tablet    Non-recurrent acute serous otitis media of both ears    Current Assessment & Plan     Lincomycin 600mg given. No adverse reaction noted.   Doxycycline prescribed to take as directed. Instructed to take all abx until gone even if start to feel better.    Instructed may alternate Tylenol/Motrin for pain/fever if not contraindicated.    Instructed to RTC for any new/worsening/persisting " ssx.           Relevant Medications    lincomycin injection 600 mg (Completed)    doxycycline (VIBRAMYCIN) 100 MG Cap     Chronic congestive heart failure, unspecified heart failure type    Non-recurrent acute serous otitis media of both ears  -     lincomycin injection 600 mg  -     doxycycline (VIBRAMYCIN) 100 MG Cap; Take 1 capsule (100 mg total) by mouth every 12 (twelve) hours. for 10 days  Dispense: 20 capsule; Refill: 0    Edema, unspecified type  -     furosemide (LASIX) 20 MG tablet; Take 1 tablet (20 mg total) by mouth once daily. As needed for swelling  Dispense: 90 tablet; Refill: 1    Chronic respiratory failure with hypoxia, on home O2 therapy       Health Maintenance Topics with due status: Not Due       Topic Last Completion Date    Colorectal Cancer Screening 02/05/2016    TETANUS VACCINE 02/15/2020    Lipid Panel 05/20/2022         No future appointments.     No follow-ups on file.    Health Maintenance Due   Topic Date Due    Hepatitis C Screening  Never done    Shingles Vaccine (1 of 2) Never done    RSV Vaccine (Age 60+ and Pregnant patients) (1 - 1-dose 60+ series) Never done    LDCT Lung Screen  11/01/2022    DEXA Scan  09/21/2023    Influenza Vaccine (1) 09/01/2024        Signature:  LELAND Elizabeth    Date of encounter: 9/12/24

## 2024-09-23 RX ORDER — FLUTICASONE PROPIONATE 50 MCG
1 SPRAY, SUSPENSION (ML) NASAL DAILY
Qty: 16 G | Refills: 2 | Status: SHIPPED | OUTPATIENT
Start: 2024-09-23

## 2024-11-21 ENCOUNTER — PATIENT OUTREACH (OUTPATIENT)
Dept: ADMINISTRATIVE | Facility: CLINIC | Age: 74
End: 2024-11-21

## 2024-11-21 ENCOUNTER — EXTERNAL HOSPITAL ADMISSION (OUTPATIENT)
Dept: ADMINISTRATIVE | Facility: CLINIC | Age: 74
End: 2024-11-21

## 2024-11-21 NOTE — PROGRESS NOTES
C3 nurse spoke with Geoffrey Gleason  for a [facility] post hospital discharge follow up call. The patient has a scheduled [facility] appointment with Moon Shields FNP  on 11/26/24 @ 1000.    Medication changes:  (N) diltiazem 180mg take 1 capsule by mouth daily  (N) meclizine 12.5mg take 1 tablet by mouth 3 times daily as needed  (N) nicotine 14mg/24hr place one patch on skin daily  (N) trazodone 100mg take 1 tablet by mouth at bedtime  (DC) gabapentin

## 2024-11-26 ENCOUNTER — OFFICE VISIT (OUTPATIENT)
Dept: FAMILY MEDICINE | Facility: CLINIC | Age: 74
End: 2024-11-26
Payer: MEDICARE

## 2024-11-26 VITALS
BODY MASS INDEX: 21.38 KG/M2 | WEIGHT: 144.38 LBS | TEMPERATURE: 98 F | HEART RATE: 92 BPM | SYSTOLIC BLOOD PRESSURE: 106 MMHG | OXYGEN SATURATION: 96 % | RESPIRATION RATE: 21 BRPM | DIASTOLIC BLOOD PRESSURE: 65 MMHG | HEIGHT: 69 IN

## 2024-11-26 DIAGNOSIS — R00.0 TACHYCARDIA: ICD-10-CM

## 2024-11-26 DIAGNOSIS — G47.09 OTHER INSOMNIA: ICD-10-CM

## 2024-11-26 DIAGNOSIS — Z23 NEED FOR VACCINATION: ICD-10-CM

## 2024-11-26 DIAGNOSIS — Z09 HOSPITAL DISCHARGE FOLLOW-UP: Primary | ICD-10-CM

## 2024-11-26 DIAGNOSIS — Z99.81 CHRONIC RESPIRATORY FAILURE WITH HYPOXIA, ON HOME O2 THERAPY: ICD-10-CM

## 2024-11-26 DIAGNOSIS — J96.11 CHRONIC RESPIRATORY FAILURE WITH HYPOXIA, ON HOME O2 THERAPY: ICD-10-CM

## 2024-11-26 RX ORDER — IBUPROFEN 200 MG
1 TABLET ORAL
COMMUNITY
Start: 2024-11-20 | End: 2024-12-20

## 2024-11-26 RX ORDER — DILTIAZEM HYDROCHLORIDE 180 MG/1
180 CAPSULE, COATED, EXTENDED RELEASE ORAL
COMMUNITY
Start: 2024-11-20 | End: 2024-11-26 | Stop reason: SDUPTHER

## 2024-11-26 RX ORDER — TRAZODONE HYDROCHLORIDE 100 MG/1
100 TABLET ORAL
COMMUNITY
Start: 2024-11-20 | End: 2024-11-26 | Stop reason: SDUPTHER

## 2024-11-26 RX ORDER — MECLIZINE HCL 12.5 MG 12.5 MG/1
12.5 TABLET ORAL
COMMUNITY
Start: 2024-11-20 | End: 2024-12-20

## 2024-11-26 RX ORDER — TRAZODONE HYDROCHLORIDE 100 MG/1
150 TABLET ORAL NIGHTLY
Qty: 135 TABLET | Refills: 1 | Status: SHIPPED | OUTPATIENT
Start: 2024-11-26 | End: 2025-05-25

## 2024-11-26 RX ORDER — DILTIAZEM HYDROCHLORIDE 180 MG/1
180 CAPSULE, COATED, EXTENDED RELEASE ORAL DAILY
Qty: 90 CAPSULE | Refills: 0 | Status: SHIPPED | OUTPATIENT
Start: 2024-11-26 | End: 2025-02-24

## 2024-11-26 NOTE — PROGRESS NOTES
"   LELAND Elizabeth   [facility]  [address]  [place] [address]     PATIENT NAME: Geoffrey Gleason  : 1950  DATE: 24  MRN: [id number]      Billing Provider: LELAND Elizabeth  Level of Service:   Patient PCP Information       Provider PCP Type    LELAND Elizabeth [facility]            Reason for Visit / Chief Complaint: Follow-up (Pt is here for a hospital follow up he fell and broke his hip. He went to swing after. )   Health Maintenance Due   Topic Date Due    Hepatitis C Screening  Never done    Shingles Vaccine (1 of 2) Never done    LDCT Lung Screen  2022    DEXA Scan  2023    RSV Vaccine (Age 60+ and Pregnant patients) (1 - 1-dose 75+ series) Never done          Update PCP  Update Chief Complaint         History of Present Illness / Problem Focused Workflow     Geoffrey Gleason presents to the clinic for hospital dc f/u. Pt fell at the end of September and had to have sx to left hip. He has been in swingbed at [facility] and was discharged don . He has wound to his left buttock that was being followed by Dr. Cisse but he would like to be referred to the wound care team here. We sent referral yesterday so informed pt he should be hearing from this. He does have HH coming out to monitor wound and has been performing wound care to wound. He states he also had PT come evaluate him this week. Pt has his portable O2 on and O2 is much better than when pt usually comes in. He does have the portable tank but is trying to get the smaller compressor. He states he has talked to the supply store and it just has not came in yet. He states she had to have a bronchoscopy while he was in the hospital and this has helped his breathing. He is walking with walker today. Pt states he has stopped smoking since he fell and he has not had a cigarette since then. Praised pt for this! Pt was prescribed 2 medications while in hospital. He states cardizem was prescribed due to "high heart rate" and " "trazodone for his sleep. He is currently taking 100mg nightly of trazodone but states it does not help much. He states he will need refills on both of these as he was only given a 30 day supply. He would also like flu shot today. He denies any other needs at this time. NAD noted.     No results found for: "HGBA1C"     CMP  Sodium   Date Value Ref Range Status   06/17/2024 135 (L) 136 - 145 mmol/L Final     Potassium   Date Value Ref Range Status   06/17/2024 3.7 3.5 - 5.1 mmol/L Final     Chloride   Date Value Ref Range Status   06/17/2024 94 (L) 98 - 107 mmol/L Final     CO2   Date Value Ref Range Status   06/17/2024 33 (H) 21 - 32 mmol/L Final     Glucose   Date Value Ref Range Status   06/17/2024 82 74 - 106 mg/dL Final     BUN   Date Value Ref Range Status   06/17/2024 9 7 - 18 mg/dL Final     Creatinine   Date Value Ref Range Status   06/17/2024 0.62 (L) 0.70 - 1.30 mg/dL Final     Calcium   Date Value Ref Range Status   06/17/2024 8.5 8.5 - 10.1 mg/dL Final     Total Protein   Date Value Ref Range Status   04/08/2024 6.6 6.4 - 8.2 g/dL Final     Albumin   Date Value Ref Range Status   04/08/2024 3.3 (L) 3.5 - 5.0 g/dL Final     Bilirubin, Total   Date Value Ref Range Status   04/08/2024 0.9 >0.0 - 1.2 mg/dL Final     Alk Phos   Date Value Ref Range Status   04/08/2024 76 45 - 115 U/L Final     AST   Date Value Ref Range Status   04/08/2024 10 (L) 15 - 37 U/L Final     ALT   Date Value Ref Range Status   04/08/2024 29 16 - 61 U/L Final     Anion Gap   Date Value Ref Range Status   06/17/2024 12 7 - 16 mmol/L Final     eGFR   Date Value Ref Range Status   06/17/2024 100 >=60 mL/min/1.73m2 Final        Lab Results   Component Value Date    WBC 7.09 06/17/2024    RBC 5.35 06/17/2024    HGB 15.3 06/17/2024    HCT 51.5 06/17/2024    MCV 96.3 (H) 06/17/2024    MCH 28.6 06/17/2024    MCHC 29.7 (L) 06/17/2024    RDW 14.1 06/17/2024     06/17/2024    MPV 9.9 06/17/2024    LYMPH 7.5 (L) 06/17/2024    LYMPH 0.53 " "(L) 06/17/2024    MONO 8.2 (H) 06/17/2024    EOS 0.04 06/17/2024    BASO 0.03 06/17/2024    EOSINOPHIL 0.6 (L) 06/17/2024    BASOPHIL 0.4 06/17/2024        No results found for: "CHOL"  No results found for: "HDL"  No results found for: "LDLCALC"  No results found for: "TRIG"  No results found for: "CHOLHDL"     Wt Readings from Last 3 Encounters:   11/26/24 1023 65.5 kg (144 lb 6.4 oz)   09/12/24 1318 70.6 kg (155 lb 9.6 oz)   06/17/24 1426 75 kg (165 lb 6.4 oz)        BP Readings from Last 3 Encounters:   11/26/24 106/65   09/12/24 127/72   06/17/24 116/65        Review of Systems     Review of Systems   Constitutional: Negative.    Eyes: Negative.    Respiratory:  Positive for cough, shortness of breath and wheezing.    Cardiovascular: Negative.    Gastrointestinal: Negative.    Endocrine: Negative.    Genitourinary: Negative.    Musculoskeletal:  Positive for arthralgias.   Integumentary:  Negative.   Neurological:  Positive for coordination difficulties.   Hematological: Negative.    Psychiatric/Behavioral: Negative.          Medical / Social / Family History     Past Medical History:   Diagnosis Date    Cellulitis 04/14/2022    Cigarette smoker 03/03/2022    COPD (chronic obstructive pulmonary disease)     COPD exacerbation 02/09/2024    COPD with acute exacerbation 11/28/2023    Left flank pain 06/17/2024    Left otitis media 04/01/2024    Middle ear effusion, right 05/08/2024    Non-recurrent acute serous otitis media of both ears 09/12/2024    Non-recurrent acute suppurative otitis media of left ear without spontaneous rupture of tympanic membrane 05/08/2024    Pneumonia of left lower lobe due to infectious organism 04/12/2024    Shortness of breath 03/03/2022    Swelling of both ankles 04/14/2022    Swelling of both lower extremities 04/18/2022       History reviewed. No pertinent surgical history.    Social History    reports that he has been smoking cigarettes. He started smoking about 56 years ago. " He has a 56.9 pack-year smoking history. He has been exposed to tobacco smoke. He has never used smokeless tobacco. He reports that he does not currently use alcohol. He reports that he does not use drugs.    Family History  's family history is not on file.    Medications and Allergies     Medications  Outpatient Medications Marked as Taking for the 24 encounter (Office Visit) with Moon Shields FNP   Medication Sig Dispense Refill    arformoteroL (BROVANA) 15 mcg/2 mL Nebu 2 ml Inhalation Twice a day      aspirin 81 MG Chew Take 81 mg by mouth once daily.      budesonide (PULMICORT) 0.5 mg/2 mL nebulizer solution 2 ml      cetirizine (ZYRTEC) 10 MG tablet 1 tablet Orally Once a day for 30 days      DALIRESP 500 mcg Tab Take 1 tablet by mouth once daily.      fluticasone propionate (FLONASE ALLERGY RELIEF) 50 mcg/actuation nasal spray 1 spray (50 mcg total) by Each Nostril route Daily. 16 g 2    furosemide (LASIX) 20 MG tablet Take 1 tablet (20 mg total) by mouth once daily. As needed for swelling 90 tablet 1    levalbuterol (XOPENEX HFA) 45 mcg/actuation inhaler SMARTSI Puff(s) By Mouth Every 6 Hours PRN      levalbuterol (XOPENEX) 1.25 mg/3 mL nebulizer solution USE ONE vial via NEBULIZER EVERY 8 HOURS AS NEEDED      meclizine (ANTIVERT) 12.5 mg tablet Take 12.5 mg by mouth.      nicotine (NICODERM CQ) 14 mg/24 hr Place 1 patch onto the skin.      OXYGEN-AIR DELIVERY SYSTEMS MISC 2 L by Nasal route Daily.      OXYGEN-AIR DELIVERY SYSTEMS MISC 2 L by Nasal route.      tamsulosin (FLOMAX) 0.4 mg Cap Take 1 capsule (0.4 mg total) by mouth once daily. 90 capsule 3    [DISCONTINUED] diltiaZEM (CARDIZEM CD) 180 MG 24 hr capsule Take 180 mg by mouth.      [DISCONTINUED] traZODone (DESYREL) 100 MG tablet Take 100 mg by mouth.         Allergies  Review of patient's allergies indicates:   Allergen Reactions    Cephalexin Hives and Other (See Comments)     Swelling SOB      Amoxicillin Rash       Physical  "Examination     Vitals:    11/26/24 1023   BP: 106/65   BP Location: Right arm   Patient Position: Sitting   Pulse: 92   Resp: (!) 21   Temp: 97.7 °F (36.5 °C)   TempSrc: Oral   SpO2: 96%   Weight: 65.5 kg (144 lb 6.4 oz)   Height: 5' 9" (1.753 m)      Physical Exam  Constitutional:       Appearance: Normal appearance.      Interventions: Nasal cannula in place.   HENT:      Head: Normocephalic.      Nose: Nose normal.   Eyes:      Extraocular Movements: Extraocular movements intact.   Cardiovascular:      Rate and Rhythm: Normal rate and regular rhythm.      Pulses: Normal pulses.      Heart sounds: Normal heart sounds.   Pulmonary:      Effort: Pulmonary effort is normal.      Breath sounds: Examination of the left-lower field reveals wheezing. Wheezing present.      Comments: Pt states is normal for him but breathing has been much better  Musculoskeletal:      Comments: Amb with walker   Skin:     General: Skin is warm and dry.      Capillary Refill: Capillary refill takes less than 2 seconds.   Neurological:      General: No focal deficit present.      Mental Status: He is alert and oriented to person, place, and time.      Coordination: Coordination abnormal.      Gait: Gait abnormal.   Psychiatric:         Mood and Affect: Mood normal.         Behavior: Behavior normal.          Assessment and Plan (including Health Maintenance)      Problem List  Smart Sets  Document Outside HM   :    Plan:     There are no Patient Instructions on file for this visit.       Health Maintenance Due   Topic Date Due    Hepatitis C Screening  Never done    Shingles Vaccine (1 of 2) Never done    LDCT Lung Screen  11/01/2022    DEXA Scan  09/21/2023    RSV Vaccine (Age 60+ and Pregnant patients) (1 - 1-dose 75+ series) Never done       Problem List Items Addressed This Visit       Chronic respiratory failure with hypoxia, on home O2 therapy    Current Assessment & Plan     Instructed pt to continue home o2.          Hospital " discharge follow-up - Primary    Current Assessment & Plan     Pt doing well currently with home health coming out to check on him. He should be hearing from appt with wound care referral.          Need for vaccination    Current Assessment & Plan     Flu shot given with no adverse reaction noted.          Relevant Orders    Influenza - Trivalent (Adjuvanted) (Completed)    Other insomnia    Current Assessment & Plan     Instructed may increase trazodone to 1.5 tablets for a total of 150mg nightly to see if this helps. Pt vu.          Relevant Medications    traZODone (DESYREL) 100 MG tablet    Tachycardia    Current Assessment & Plan     Refilled Cardizem.          Relevant Medications    diltiaZEM (CARDIZEM CD) 180 MG 24 hr capsule     Hospital discharge follow-up    Need for vaccination  -     Influenza - Trivalent (Adjuvanted)    Other insomnia  -     traZODone (DESYREL) 100 MG tablet; Take 1.5 tablets (150 mg total) by mouth every evening.  Dispense: 135 tablet; Refill: 1    Tachycardia  -     diltiaZEM (CARDIZEM CD) 180 MG 24 hr capsule; Take 1 capsule (180 mg total) by mouth once daily.  Dispense: 90 capsule; Refill: 0    Chronic respiratory failure with hypoxia, on home O2 therapy       Health Maintenance Topics with due status: Not Due       Topic Last Completion Date    Colorectal Cancer Screening 02/05/2016    TETANUS VACCINE 02/15/2020    Lipid Panel 05/20/2022         Future Appointments   Date Time Provider Department Center   2/26/2025 11:00 AM Moon Shields FNP [facility]        No follow-ups on file.    Health Maintenance Due   Topic Date Due    Hepatitis C Screening  Never done    Shingles Vaccine (1 of 2) Never done    LDCT Lung Screen  11/01/2022    DEXA Scan  09/21/2023    RSV Vaccine (Age 60+ and Pregnant patients) (1 - 1-dose 75+ series) Never done        Signature:  LELAND Elizabeth    Date of encounter: 11/26/24

## 2024-11-27 PROBLEM — H66.002 NON-RECURRENT ACUTE SUPPURATIVE OTITIS MEDIA OF LEFT EAR WITHOUT SPONTANEOUS RUPTURE OF TYMPANIC MEMBRANE: Status: RESOLVED | Noted: 2024-05-08 | Resolved: 2024-11-27

## 2024-11-27 PROBLEM — G47.09 OTHER INSOMNIA: Status: ACTIVE | Noted: 2024-11-27

## 2024-11-27 PROBLEM — Z09 HOSPITAL DISCHARGE FOLLOW-UP: Status: ACTIVE | Noted: 2024-11-27

## 2024-11-27 PROBLEM — H65.03 NON-RECURRENT ACUTE SEROUS OTITIS MEDIA OF BOTH EARS: Status: RESOLVED | Noted: 2024-09-12 | Resolved: 2024-11-27

## 2024-11-27 PROBLEM — Z23 NEED FOR VACCINATION: Status: ACTIVE | Noted: 2024-11-27

## 2024-11-27 PROBLEM — F17.210 CIGARETTE SMOKER: Status: RESOLVED | Noted: 2022-03-03 | Resolved: 2024-11-27

## 2024-11-27 PROBLEM — J44.1 COPD EXACERBATION: Status: RESOLVED | Noted: 2024-02-09 | Resolved: 2024-11-27

## 2024-11-27 PROBLEM — R00.0 TACHYCARDIA: Status: ACTIVE | Noted: 2024-11-27

## 2024-11-27 NOTE — ASSESSMENT & PLAN NOTE
Instructed may increase trazodone to 1.5 tablets for a total of 150mg nightly to see if this helps. Pt veronica.

## 2024-11-27 NOTE — ASSESSMENT & PLAN NOTE
Pt doing well currently with home health coming out to check on him. He should be hearing from appt with wound care referral.

## 2024-12-02 DIAGNOSIS — L89.323 PRESSURE ULCER OF LEFT BUTTOCK, STAGE 3: Primary | ICD-10-CM

## 2024-12-02 RX ORDER — COLLAGENASE SANTYL 250 [ARB'U]/G
OINTMENT TOPICAL DAILY
Qty: 90 G | Refills: 0 | Status: SHIPPED | OUTPATIENT
Start: 2024-12-02

## 2024-12-04 DIAGNOSIS — L89.309 PRESSURE INJURY OF SKIN OF BUTTOCK, UNSPECIFIED INJURY STAGE, UNSPECIFIED LATERALITY: Primary | ICD-10-CM

## 2024-12-05 ENCOUNTER — OFFICE VISIT (OUTPATIENT)
Dept: WOUND CARE | Facility: HOSPITAL | Age: 74
End: 2024-12-05
Attending: FAMILY MEDICINE
Payer: MEDICARE

## 2024-12-05 VITALS — HEART RATE: 97 BPM | SYSTOLIC BLOOD PRESSURE: 118 MMHG | DIASTOLIC BLOOD PRESSURE: 68 MMHG | TEMPERATURE: 98 F

## 2024-12-05 DIAGNOSIS — L89.152 PRESSURE INJURY OF SACRAL REGION, STAGE 2: Primary | ICD-10-CM

## 2024-12-05 DIAGNOSIS — R35.0 BENIGN PROSTATIC HYPERPLASIA WITH URINARY FREQUENCY: ICD-10-CM

## 2024-12-05 DIAGNOSIS — R60.9 EDEMA, UNSPECIFIED TYPE: ICD-10-CM

## 2024-12-05 DIAGNOSIS — L89.309 PRESSURE INJURY OF SKIN OF BUTTOCK, UNSPECIFIED INJURY STAGE, UNSPECIFIED LATERALITY: ICD-10-CM

## 2024-12-05 DIAGNOSIS — N40.1 BENIGN PROSTATIC HYPERPLASIA WITH URINARY FREQUENCY: ICD-10-CM

## 2024-12-05 DIAGNOSIS — I50.9 CHRONIC CONGESTIVE HEART FAILURE, UNSPECIFIED HEART FAILURE TYPE: ICD-10-CM

## 2024-12-05 PROCEDURE — 99203 OFFICE O/P NEW LOW 30 MIN: CPT | Mod: ,,, | Performed by: FAMILY MEDICINE

## 2024-12-05 NOTE — PATIENT INSTRUCTIONS
Wash hands before and after wound care.  Apply nickel size santyl to wound bad and cover with 4X4's and secure with pater tape.  Monitor closely for s/s of infection including fever, chills, increase in pain, odor from wound, and increased redness from foot. Go to ER if any complications develop.     Diet:   Increase protein intake, avoid fried, fatty foods and foods high in simple carbs.   Vitamins:  Take vitamin C 1000 mg, zinc 50mg, vitamin d 5000 units, and a daily multivitamin. Sj is a good source of protein and nutrients to aid in wound healing.

## 2024-12-05 NOTE — PROGRESS NOTES
Subjective:      Patient ID: Geoffrey Gleason is a 74 y.o. male.    Chief Complaint: Non-healing Wound Follow Up    Geoffrey Gleason a 74 y.o. male presents for follow up on all regular problems which are reviewed and discussed.   Broke hip.  Developed sacral decub. Using santyl. 300$/tube  Discussed with care giver  Problem List Items Addressed This Visit          Cardiac/Vascular    Chronic congestive heart failure       Renal/    Benign prostatic hyperplasia with lower urinary tract symptoms       Orthopedic    Pressure injury of sacral region, stage 2 - Primary       Other    Edema       Past Medical History:  Past Medical History:   Diagnosis Date    Cellulitis 04/14/2022    Cigarette smoker 03/03/2022    COPD (chronic obstructive pulmonary disease)     COPD exacerbation 02/09/2024    COPD with acute exacerbation 11/28/2023    Left flank pain 06/17/2024    Left otitis media 04/01/2024    Middle ear effusion, right 05/08/2024    Non-recurrent acute serous otitis media of both ears 09/12/2024    Non-recurrent acute suppurative otitis media of left ear without spontaneous rupture of tympanic membrane 05/08/2024    Pneumonia of left lower lobe due to infectious organism 04/12/2024    Shortness of breath 03/03/2022    Swelling of both ankles 04/14/2022    Swelling of both lower extremities 04/18/2022     History reviewed. No pertinent surgical history.  Review of patient's allergies indicates:   Allergen Reactions    Cephalexin Hives and Other (See Comments)     Swelling SOB      Amoxicillin Rash     Current Outpatient Medications on File Prior to Visit   Medication Sig Dispense Refill    arformoteroL (BROVANA) 15 mcg/2 mL Nebu 2 ml Inhalation Twice a day      aspirin 81 MG Chew Take 81 mg by mouth once daily.      budesonide (PULMICORT) 0.5 mg/2 mL nebulizer solution 2 ml      cetirizine (ZYRTEC) 10 MG tablet 1 tablet Orally Once a day for 30 days      collagenase (SANTYL) ointment Apply  topically once daily. 90 g 0    DALIRESP 500 mcg Tab Take 1 tablet by mouth once daily.      diltiaZEM (CARDIZEM CD) 180 MG 24 hr capsule Take 1 capsule (180 mg total) by mouth once daily. 90 capsule 0    fluticasone propionate (FLONASE ALLERGY RELIEF) 50 mcg/actuation nasal spray 1 spray (50 mcg total) by Each Nostril route Daily. 16 g 2    furosemide (LASIX) 20 MG tablet Take 1 tablet (20 mg total) by mouth once daily. As needed for swelling 90 tablet 1    levalbuterol (XOPENEX HFA) 45 mcg/actuation inhaler SMARTSI Puff(s) By Mouth Every 6 Hours PRN      levalbuterol (XOPENEX) 1.25 mg/3 mL nebulizer solution USE ONE vial via NEBULIZER EVERY 8 HOURS AS NEEDED      meclizine (ANTIVERT) 12.5 mg tablet Take 12.5 mg by mouth.      nicotine (NICODERM CQ) 14 mg/24 hr Place 1 patch onto the skin.      OXYGEN-AIR DELIVERY SYSTEMS MISC 2 L by Nasal route Daily.      tamsulosin (FLOMAX) 0.4 mg Cap Take 1 capsule (0.4 mg total) by mouth once daily. 90 capsule 3    traZODone (DESYREL) 100 MG tablet Take 1.5 tablets (150 mg total) by mouth every evening. 135 tablet 1    ciclopirox (LOPROX) 0.77 % Crea Apply topically 2 (two) times daily. (Patient not taking: Reported on 2024)      gabapentin (NEURONTIN) 300 MG capsule Take 1 capsule (300 mg total) by mouth every evening. (Patient not taking: Reported on 2024) 90 capsule 1    ketoconazole (NIZORAL) 2 % shampoo SMARTSIG:Topical 2-3 Times Weekly (Patient not taking: Reported on 2024)      levoFLOXacin (LEVAQUIN) 500 MG tablet Take 1 tablet (500 mg total) by mouth once daily. (Patient not taking: Reported on 2024) 5 tablet 0    metOLazone (ZAROXOLYN) 5 MG tablet 1 tablet Orally Once a day for 30 day(s) (Patient not taking: Reported on 2024)      OXYGEN-AIR DELIVERY SYSTEMS MISC 2 L by Nasal route.       Current Facility-Administered Medications on File Prior to Visit   Medication Dose Route Frequency Provider Last Rate Last Admin     [DISCONTINUED] GENERIC EXTERNAL MEDICATION     Provider, Generic External Data        [DISCONTINUED] GENERIC EXTERNAL MEDICATION     Provider, Generic External Data         Social History     Socioeconomic History    Marital status:    Tobacco Use    Smoking status: Former     Current packs/day: 0.00     Average packs/day: 1 pack/day for 56.7 years (56.7 ttl pk-yrs)     Types: Cigarettes     Start date:      Quit date: 2024     Years since quittin.1     Passive exposure: Current    Smokeless tobacco: Never    Tobacco comments:     He is currently on nicotine patches   Substance and Sexual Activity    Alcohol use: Not Currently    Drug use: Never    Sexual activity: Yes     Social Drivers of Health      Received from [facility]    BOC Financial Resource Needs    Received from [facility]    BOC Food Insecurity    Received from [facility]    BOC Transportation Needs    Received from [facility]    BOC Housing Stability Source     No family history on file.    Review of Systems   Constitutional: Negative.    HENT:  Negative for congestion, ear pain, nosebleeds and trouble swallowing.    Eyes:  Negative for pain and itching.   Respiratory:  Negative for chest tightness.    Cardiovascular:  Negative for chest pain.   Gastrointestinal:  Negative for abdominal distention.   Endocrine: Negative for cold intolerance and heat intolerance.   Genitourinary:  Negative for difficulty urinating.   Musculoskeletal:  Negative for arthralgias.   Skin:  Positive for wound. Negative for color change, pallor and rash.   Neurological:  Negative for dizziness.       Objective:     /68 (BP Location: Left arm, Patient Position: Sitting)   Pulse 97   Temp 98.3 °F (36.8 °C) (Oral)     Physical Exam  Constitutional:       Appearance: Normal appearance. He is not ill-appearing or  diaphoretic.   HENT:      Head: Normocephalic and atraumatic.      Right Ear: External ear normal.      Left Ear: External ear normal.      Nose: Nose normal.      Mouth/Throat:      Mouth: Mucous membranes are moist.      Pharynx: Oropharynx is clear.   Eyes:      Pupils: Pupils are equal, round, and reactive to light.   Cardiovascular:      Rate and Rhythm: Normal rate and regular rhythm.      Heart sounds: Normal heart sounds.   Pulmonary:      Effort: Pulmonary effort is normal.      Breath sounds: Normal breath sounds.   Abdominal:      Palpations: Abdomen is soft.   Musculoskeletal:         General: Normal range of motion.      Cervical back: Normal range of motion and neck supple.   Skin:     General: Skin is warm and dry.      Coloration: Skin is not jaundiced or pale.      Findings: Lesion present. No bruising, erythema or rash.   Neurological:      General: No focal deficit present.      Mental Status: He is alert.   Psychiatric:         Mood and Affect: Mood normal.         Behavior: Behavior normal.         Thought Content: Thought content normal.         Judgment: Judgment normal.       1. Pressure injury of sacral region, stage 2    2. Edema, unspecified type    3. Benign prostatic hyperplasia with urinary frequency    4. Chronic congestive heart failure, unspecified heart failure type        Plan:     Problem List Items Addressed This Visit          Cardiac/Vascular    Chronic congestive heart failure       Renal/    Benign prostatic hyperplasia with lower urinary tract symptoms       Orthopedic    Pressure injury of sacral region, stage 2 - Primary       Other    Edema     No follow-ups on file.  Santyl qd after vashe cleaning. To something cheaper later  Off loading  Diet!!  Vitamins bid  Fu 1 wk    I am having Geoffrey THIERRY Gleason maintain his levalbuterol, DALIRESP, budesonide, levalbuterol, gabapentin, ketoconazole, ciclopirox, tamsulosin, arformoteroL, cetirizine, aspirin, OXYGEN-AIR DELIVERY  SYSTEMS MISC, levoFLOXacin, metOLazone, furosemide, fluticasone propionate, OXYGEN-AIR DELIVERY SYSTEMS MISC, nicotine, meclizine, diltiaZEM, traZODone, and SantyL.    Geoffrey was seen today for non-healing wound follow up.    Diagnoses and all orders for this visit:    Pressure injury of sacral region, stage 2    Edema, unspecified type    Benign prostatic hyperplasia with urinary frequency    Chronic congestive heart failure, unspecified heart failure type         [unfilled]  No orders of the defined types were placed in this encounter.

## 2024-12-09 ENCOUNTER — EXTERNAL HOME HEALTH (OUTPATIENT)
Dept: HOME HEALTH SERVICES | Facility: HOSPITAL | Age: 74
End: 2024-12-09
Payer: MEDICARE

## 2024-12-12 ENCOUNTER — OFFICE VISIT (OUTPATIENT)
Dept: WOUND CARE | Facility: HOSPITAL | Age: 74
End: 2024-12-12
Attending: FAMILY MEDICINE
Payer: MEDICARE

## 2024-12-12 VITALS
RESPIRATION RATE: 20 BRPM | HEART RATE: 96 BPM | TEMPERATURE: 98 F | DIASTOLIC BLOOD PRESSURE: 71 MMHG | SYSTOLIC BLOOD PRESSURE: 129 MMHG

## 2024-12-12 DIAGNOSIS — L89.152 PRESSURE INJURY OF SACRAL REGION, STAGE 2: Primary | ICD-10-CM

## 2024-12-12 DIAGNOSIS — C61 PROSTATE CANCER: ICD-10-CM

## 2024-12-12 DIAGNOSIS — R60.9 EDEMA, UNSPECIFIED TYPE: ICD-10-CM

## 2024-12-12 DIAGNOSIS — N40.1 BENIGN PROSTATIC HYPERPLASIA WITH URINARY FREQUENCY: ICD-10-CM

## 2024-12-12 DIAGNOSIS — R00.0 TACHYCARDIA: ICD-10-CM

## 2024-12-12 DIAGNOSIS — R35.0 BENIGN PROSTATIC HYPERPLASIA WITH URINARY FREQUENCY: ICD-10-CM

## 2024-12-12 PROCEDURE — 99214 OFFICE O/P EST MOD 30 MIN: CPT | Mod: ,,, | Performed by: FAMILY MEDICINE

## 2024-12-12 RX ORDER — PREDNISONE 20 MG/1
40 TABLET ORAL DAILY
Qty: 10 TABLET | Refills: 1 | Status: SHIPPED | OUTPATIENT
Start: 2024-12-12

## 2024-12-12 RX ORDER — SULFAMETHOXAZOLE AND TRIMETHOPRIM 800; 160 MG/1; MG/1
1 TABLET ORAL 2 TIMES DAILY
Qty: 60 TABLET | Refills: 1 | Status: SHIPPED | OUTPATIENT
Start: 2024-12-12

## 2024-12-12 RX ORDER — MOMETASONE FUROATE 1 MG/G
CREAM TOPICAL DAILY
Qty: 45 G | Refills: 5 | Status: SHIPPED | OUTPATIENT
Start: 2024-12-12

## 2024-12-12 NOTE — PROGRESS NOTES
Subjective:      Patient ID: Geoffrey Gleason is a 74 y.o. male.    Chief Complaint: Non-healing Wound Follow Up    Geoffrey Gleason a 74 y.o. male presents for follow up on all regular problems which are reviewed and discussed.   New rash on legs plus dry skin sacral looks better  Problem List Items Addressed This Visit          Cardiac/Vascular    Tachycardia       Renal/    Benign prostatic hyperplasia with lower urinary tract symptoms       Oncology    Prostate cancer       Orthopedic    Pressure injury of sacral region, stage 2 - Primary       Other    Edema       Past Medical History:  Past Medical History:   Diagnosis Date    Cellulitis 04/14/2022    Cigarette smoker 03/03/2022    COPD (chronic obstructive pulmonary disease)     COPD exacerbation 02/09/2024    COPD with acute exacerbation 11/28/2023    Left flank pain 06/17/2024    Left otitis media 04/01/2024    Middle ear effusion, right 05/08/2024    Non-recurrent acute serous otitis media of both ears 09/12/2024    Non-recurrent acute suppurative otitis media of left ear without spontaneous rupture of tympanic membrane 05/08/2024    Pneumonia of left lower lobe due to infectious organism 04/12/2024    Shortness of breath 03/03/2022    Swelling of both ankles 04/14/2022    Swelling of both lower extremities 04/18/2022     History reviewed. No pertinent surgical history.  Review of patient's allergies indicates:   Allergen Reactions    Cephalexin Hives and Other (See Comments)     Swelling SOB      Amoxicillin Rash     Current Outpatient Medications on File Prior to Visit   Medication Sig Dispense Refill    arformoteroL (BROVANA) 15 mcg/2 mL Nebu 2 ml Inhalation Twice a day      aspirin 81 MG Chew Take 81 mg by mouth once daily.      budesonide (PULMICORT) 0.5 mg/2 mL nebulizer solution 2 ml      cetirizine (ZYRTEC) 10 MG tablet 1 tablet Orally Once a day for 30 days      collagenase (SANTYL) ointment Apply topically once daily. 90 g 0    DALIRESP 500 mcg Tab  Take 1 tablet by mouth once daily.      diltiaZEM (CARDIZEM CD) 180 MG 24 hr capsule Take 1 capsule (180 mg total) by mouth once daily. 90 capsule 0    fluticasone propionate (FLONASE ALLERGY RELIEF) 50 mcg/actuation nasal spray 1 spray (50 mcg total) by Each Nostril route Daily. 16 g 2    furosemide (LASIX) 20 MG tablet Take 1 tablet (20 mg total) by mouth once daily. As needed for swelling 90 tablet 1    levalbuterol (XOPENEX HFA) 45 mcg/actuation inhaler SMARTSI Puff(s) By Mouth Every 6 Hours PRN      levalbuterol (XOPENEX) 1.25 mg/3 mL nebulizer solution USE ONE vial via NEBULIZER EVERY 8 HOURS AS NEEDED      meclizine (ANTIVERT) 12.5 mg tablet Take 12.5 mg by mouth.      nicotine (NICODERM CQ) 14 mg/24 hr Place 1 patch onto the skin.      OXYGEN-AIR DELIVERY SYSTEMS MISC 2 L by Nasal route Daily.      OXYGEN-AIR DELIVERY SYSTEMS MISC 2 L by Nasal route.      tamsulosin (FLOMAX) 0.4 mg Cap Take 1 capsule (0.4 mg total) by mouth once daily. 90 capsule 3    traZODone (DESYREL) 100 MG tablet Take 1.5 tablets (150 mg total) by mouth every evening. 135 tablet 1    ciclopirox (LOPROX) 0.77 % Crea Apply topically 2 (two) times daily. (Patient not taking: Reported on 2024)      gabapentin (NEURONTIN) 300 MG capsule Take 1 capsule (300 mg total) by mouth every evening. (Patient not taking: Reported on 2024) 90 capsule 1    ketoconazole (NIZORAL) 2 % shampoo SMARTSIG:Topical 2-3 Times Weekly (Patient not taking: Reported on 2024)      levoFLOXacin (LEVAQUIN) 500 MG tablet Take 1 tablet (500 mg total) by mouth once daily. (Patient not taking: Reported on 2024) 5 tablet 0    metOLazone (ZAROXOLYN) 5 MG tablet 1 tablet Orally Once a day for 30 day(s) (Patient not taking: Reported on 2024)       Current Facility-Administered Medications on File Prior to Visit   Medication Dose Route Frequency Provider Last Rate Last Admin    [DISCONTINUED] GENERIC EXTERNAL MEDICATION     Provider, Generic  External Data        [DISCONTINUED] GENERIC EXTERNAL MEDICATION     Provider, Generic External Data         Social History     Socioeconomic History    Marital status:    Tobacco Use    Smoking status: Former     Current packs/day: 0.00     Average packs/day: 1 pack/day for 56.7 years (56.7 ttl pk-yrs)     Types: Cigarettes     Start date:      Quit date: 2024     Years since quittin.2     Passive exposure: Current    Smokeless tobacco: Never    Tobacco comments:     He is currently on nicotine patches   Substance and Sexual Activity    Alcohol use: Not Currently    Drug use: Never    Sexual activity: Yes     Social Drivers of Health     Financial Resource Strain: Not At Risk (2024)    Received from [facility]    BOC Financial Resource Needs     Financial/Environmental Concerns: Unrecognized value   Food Insecurity: Not At Risk (2024)    Received from [facility]    BOC Food Insecurity     Financial/Environmental Concerns: Unrecognized value   Transportation Needs: Not At Risk (2024)    Received from [facility]    BOC Transportation Needs     Financial/Environmental Concerns: Unrecognized value   Housing Stability: Not At Risk (2024)    Received from [facility]    BOC Housing Stability Source     Financial/Environmental Concerns: Unrecognized value     No family history on file.    Review of Systems   Constitutional: Negative.    HENT:  Negative for congestion, ear pain, nosebleeds and trouble swallowing.    Eyes:  Negative for pain and itching.   Respiratory:  Negative for chest tightness.    Cardiovascular:  Negative for chest pain.   Gastrointestinal:  Negative for abdominal distention.   Endocrine: Negative for cold intolerance and heat intolerance.   Genitourinary:  Negative for difficulty urinating.   Musculoskeletal:  Negative for arthralgias.   Skin:   Positive for wound. Negative for color change, pallor and rash.   Neurological:  Negative for dizziness.       Objective:     /71 (BP Location: Left leg)   Pulse 96   Temp 97.5 °F (36.4 °C) (Oral)   Resp 20     Physical Exam  Constitutional:       Appearance: Normal appearance. He is obese.   HENT:      Head: Normocephalic and atraumatic.      Right Ear: External ear normal.      Left Ear: External ear normal.      Nose: Nose normal.      Mouth/Throat:      Mouth: Mucous membranes are moist.      Pharynx: Oropharynx is clear.   Eyes:      Pupils: Pupils are equal, round, and reactive to light.   Cardiovascular:      Rate and Rhythm: Normal rate and regular rhythm.      Heart sounds: Normal heart sounds.   Pulmonary:      Effort: Pulmonary effort is normal.      Breath sounds: Normal breath sounds.   Abdominal:      Palpations: Abdomen is soft.   Musculoskeletal:         General: Normal range of motion.      Cervical back: Normal range of motion and neck supple.   Skin:     General: Skin is warm and dry.      Coloration: Skin is not jaundiced or pale.      Findings: Erythema, lesion and rash present. No bruising.   Neurological:      General: No focal deficit present.      Mental Status: He is alert.   Psychiatric:         Mood and Affect: Mood normal.         Behavior: Behavior normal.         Thought Content: Thought content normal.         Judgment: Judgment normal.         1. Pressure injury of sacral region, stage 2    2. Edema, unspecified type    3. Prostate cancer    4. Benign prostatic hyperplasia with urinary frequency    5. Tachycardia        Plan:     Problem List Items Addressed This Visit          Cardiac/Vascular    Tachycardia       Renal/    Benign prostatic hyperplasia with lower urinary tract symptoms       Oncology    Prostate cancer       Orthopedic    Pressure injury of sacral region, stage 2 - Primary       Other    Edema     No follow-ups on file.  Pred and elocon for rash  Dry skin  care urged  Conyinue santyl and mepilex ag  3-4 wk fu    I am having Geoffrey Gleason start on sulfamethoxazole-trimethoprim 800-160mg, mometasone 0.1%, and predniSONE. I am also having him maintain his levalbuterol, DALIRESP, budesonide, levalbuterol, gabapentin, ketoconazole, ciclopirox, tamsulosin, arformoteroL, cetirizine, aspirin, OXYGEN-AIR DELIVERY SYSTEMS MISC, levoFLOXacin, metOLazone, furosemide, fluticasone propionate, OXYGEN-AIR DELIVERY SYSTEMS MISC, nicotine, meclizine, diltiaZEM, traZODone, and SantyL.    Geoffrey was seen today for non-healing wound follow up.    Diagnoses and all orders for this visit:    Pressure injury of sacral region, stage 2    Edema, unspecified type    Prostate cancer    Benign prostatic hyperplasia with urinary frequency    Tachycardia    Other orders  -     sulfamethoxazole-trimethoprim 800-160mg (BACTRIM DS) 800-160 mg Tab; Take 1 tablet by mouth 2 (two) times daily.  -     mometasone 0.1% (ELOCON) 0.1 % cream; Apply topically once daily.  -     predniSONE (DELTASONE) 20 MG tablet; Take 2 tablets (40 mg total) by mouth once daily.      Medications Ordered This Encounter   Medications    mometasone 0.1% (ELOCON) 0.1 % cream     Sig: Apply topically once daily.     Dispense:  45 g     Refill:  5    predniSONE (DELTASONE) 20 MG tablet     Sig: Take 2 tablets (40 mg total) by mouth once daily.     Dispense:  10 tablet     Refill:  1    sulfamethoxazole-trimethoprim 800-160mg (BACTRIM DS) 800-160 mg Tab     Sig: Take 1 tablet by mouth 2 (two) times daily.     Dispense:  60 tablet     Refill:  1     [unfilled]  No orders of the defined types were placed in this encounter.

## 2024-12-17 ENCOUNTER — DOCUMENT SCAN (OUTPATIENT)
Dept: HOME HEALTH SERVICES | Facility: HOSPITAL | Age: 74
End: 2024-12-17
Payer: MEDICARE

## 2025-01-08 NOTE — PATIENT INSTRUCTIONS
"Wash hands before and after wound care.  Apply nickel size santyl to wound bad and cover with 4X4's and secure with pater tape.  Monitor closely for s/s of infection including fever, chills, increase in pain, odor from wound, and increased redness from foot. Go to ER if any complications develop.     I & D abscess on (L) buttocks  1 cc Xylocaine 1% given to numb area  1/4 " Iodoform packing strip placed  Culture sent to lab.    Diet:   Increase protein intake, avoid fried, fatty foods and foods high in simple carbs.   Vitamins:  Take vitamin C 1000 mg, zinc 50mg, vitamin d 5000 units, and a daily multivitamin. Sj is a good source of protein and nutrients to aid in wound healing.    "

## 2025-01-09 ENCOUNTER — OFFICE VISIT (OUTPATIENT)
Dept: WOUND CARE | Facility: HOSPITAL | Age: 75
End: 2025-01-09
Attending: FAMILY MEDICINE
Payer: MEDICARE

## 2025-01-09 VITALS
RESPIRATION RATE: 20 BRPM | SYSTOLIC BLOOD PRESSURE: 135 MMHG | HEART RATE: 103 BPM | TEMPERATURE: 98 F | DIASTOLIC BLOOD PRESSURE: 73 MMHG

## 2025-01-09 DIAGNOSIS — L89.152 PRESSURE INJURY OF SACRAL REGION, STAGE 2: ICD-10-CM

## 2025-01-09 DIAGNOSIS — L02.31 ABSCESS OF LEFT BUTTOCK: ICD-10-CM

## 2025-01-09 DIAGNOSIS — L08.9 PUSTULE: ICD-10-CM

## 2025-01-09 DIAGNOSIS — R06.02 SOB (SHORTNESS OF BREATH): ICD-10-CM

## 2025-01-09 DIAGNOSIS — F41.9 ANXIETY: ICD-10-CM

## 2025-01-09 DIAGNOSIS — J44.9 CHRONIC OBSTRUCTIVE PULMONARY DISEASE, UNSPECIFIED COPD TYPE: Primary | ICD-10-CM

## 2025-01-09 PROCEDURE — 99214 OFFICE O/P EST MOD 30 MIN: CPT | Mod: 25,,, | Performed by: FAMILY MEDICINE

## 2025-01-09 PROCEDURE — 10060 I&D ABSCESS SIMPLE/SINGLE: CPT | Mod: ,,, | Performed by: FAMILY MEDICINE

## 2025-01-09 PROCEDURE — 87070 CULTURE OTHR SPECIMN AEROBIC: CPT | Performed by: FAMILY MEDICINE

## 2025-01-09 NOTE — PROGRESS NOTES
Subjective:      Patient ID: Geoffrey Gleason is a 74 y.o. male.    Chief Complaint: Non-healing Wound Follow Up and Pressure Ulcer    Geoffrey Gleason a 74 y.o. male presents for follow up on all regular problems which are reviewed and discussed.   Wound almost healed. However, just north of wound looks like pustule or cyst. Pt, wife agree to I/d.  Also, fu on sob, copd, anxiety. Reassure. Quechan, education.    Problem List Items Addressed This Visit          Psychiatric    Anxiety       Pulmonary    Chronic obstructive pulmonary disease - Primary    SOB (shortness of breath)       ID    Pustule       Orthopedic    Pressure injury of sacral region, stage 2       Past Medical History:  Past Medical History:   Diagnosis Date    Cellulitis 04/14/2022    Cigarette smoker 03/03/2022    COPD (chronic obstructive pulmonary disease)     COPD exacerbation 02/09/2024    COPD with acute exacerbation 11/28/2023    Left flank pain 06/17/2024    Left otitis media 04/01/2024    Middle ear effusion, right 05/08/2024    Non-recurrent acute serous otitis media of both ears 09/12/2024    Non-recurrent acute suppurative otitis media of left ear without spontaneous rupture of tympanic membrane 05/08/2024    Pneumonia of left lower lobe due to infectious organism 04/12/2024    Shortness of breath 03/03/2022    Swelling of both ankles 04/14/2022    Swelling of both lower extremities 04/18/2022     History reviewed. No pertinent surgical history.  Review of patient's allergies indicates:   Allergen Reactions    Cephalexin Hives and Other (See Comments)     Swelling SOB      Amoxicillin Rash     Current Outpatient Medications on File Prior to Visit   Medication Sig Dispense Refill    arformoteroL (BROVANA) 15 mcg/2 mL Nebu 2 ml Inhalation Twice a day      aspirin 81 MG Chew Take 81 mg by mouth once daily.      budesonide (PULMICORT) 0.5 mg/2 mL nebulizer solution 2 ml      cetirizine (ZYRTEC) 10 MG tablet 1 tablet Orally Once a day for 30  days      collagenase (SANTYL) ointment Apply topically once daily. 90 g 0    DALIRESP 500 mcg Tab Take 1 tablet by mouth once daily.      diltiaZEM (CARDIZEM CD) 180 MG 24 hr capsule Take 1 capsule (180 mg total) by mouth once daily. 90 capsule 0    fluticasone propionate (FLONASE ALLERGY RELIEF) 50 mcg/actuation nasal spray 1 spray (50 mcg total) by Each Nostril route Daily. 16 g 2    furosemide (LASIX) 20 MG tablet Take 1 tablet (20 mg total) by mouth once daily. As needed for swelling 90 tablet 1    levalbuterol (XOPENEX HFA) 45 mcg/actuation inhaler SMARTSI Puff(s) By Mouth Every 6 Hours PRN      levalbuterol (XOPENEX) 1.25 mg/3 mL nebulizer solution USE ONE vial via NEBULIZER EVERY 8 HOURS AS NEEDED      mometasone 0.1% (ELOCON) 0.1 % cream Apply topically once daily. 45 g 5    OXYGEN-AIR DELIVERY SYSTEMS MISC 2 L by Nasal route Daily.      OXYGEN-AIR DELIVERY SYSTEMS MISC 2 L by Nasal route.      predniSONE (DELTASONE) 20 MG tablet Take 2 tablets (40 mg total) by mouth once daily. 10 tablet 1    sulfamethoxazole-trimethoprim 800-160mg (BACTRIM DS) 800-160 mg Tab Take 1 tablet by mouth 2 (two) times daily. 60 tablet 1    tamsulosin (FLOMAX) 0.4 mg Cap Take 1 capsule (0.4 mg total) by mouth once daily. 90 capsule 3    traZODone (DESYREL) 100 MG tablet Take 1.5 tablets (150 mg total) by mouth every evening. 135 tablet 1    ciclopirox (LOPROX) 0.77 % Crea Apply topically 2 (two) times daily. (Patient not taking: Reported on 2024)      gabapentin (NEURONTIN) 300 MG capsule Take 1 capsule (300 mg total) by mouth every evening. (Patient not taking: Reported on 2024) 90 capsule 1    ketoconazole (NIZORAL) 2 % shampoo SMARTSIG:Topical 2-3 Times Weekly (Patient not taking: Reported on 2024)      levoFLOXacin (LEVAQUIN) 500 MG tablet Take 1 tablet (500 mg total) by mouth once daily. (Patient not taking: Reported on 2024) 5 tablet 0    metOLazone (ZAROXOLYN) 5 MG tablet 1 tablet Orally Once a  day for 30 day(s) (Patient not taking: Reported on 2024)       No current facility-administered medications on file prior to visit.     Social History     Socioeconomic History    Marital status:    Tobacco Use    Smoking status: Former     Current packs/day: 0.00     Average packs/day: 1 pack/day for 56.7 years (56.7 ttl pk-yrs)     Types: Cigarettes     Start date:      Quit date: 2024     Years since quittin.2     Passive exposure: Current    Smokeless tobacco: Never    Tobacco comments:     He is currently on nicotine patches   Substance and Sexual Activity    Alcohol use: Not Currently    Drug use: Never    Sexual activity: Yes     Social Drivers of Health     Financial Resource Strain: Not At Risk (2024)    Received from [facility]    BOC Financial Resource Needs     Financial/Environmental Concerns: Unrecognized value   Food Insecurity: Not At Risk (2024)    Received from [facility]    BOC Food Insecurity     Financial/Environmental Concerns: Unrecognized value   Transportation Needs: Not At Risk (2024)    Received from [facility]    BOC Transportation Needs     Financial/Environmental Concerns: Unrecognized value   Housing Stability: Not At Risk (2024)    Received from [facility]    BOC Housing Stability Source     Financial/Environmental Concerns: Unrecognized value     No family history on file.    Review of Systems   Constitutional: Negative.    HENT:  Negative for congestion, ear pain, nosebleeds and trouble swallowing.    Eyes:  Negative for pain and itching.   Respiratory:  Negative for chest tightness.    Cardiovascular:  Negative for chest pain.   Gastrointestinal:  Negative for abdominal distention.   Endocrine: Negative for cold intolerance and heat intolerance.   Genitourinary:  Negative for difficulty urinating.   Musculoskeletal:   Negative for arthralgias.   Skin:  Positive for wound. Negative for color change, pallor and rash.   Neurological:  Negative for dizziness.       Objective:     /73 (BP Location: Right arm)   Pulse 103   Temp 98.3 °F (36.8 °C) (Oral)   Resp 20     Physical Exam  Constitutional:       Appearance: Normal appearance. He is obese.   HENT:      Head: Normocephalic and atraumatic.      Right Ear: External ear normal.      Left Ear: External ear normal.      Nose: Nose normal.      Mouth/Throat:      Mouth: Mucous membranes are moist.      Pharynx: Oropharynx is clear.   Eyes:      Pupils: Pupils are equal, round, and reactive to light.   Cardiovascular:      Rate and Rhythm: Normal rate and regular rhythm.      Heart sounds: Normal heart sounds.   Pulmonary:      Effort: Pulmonary effort is normal.      Breath sounds: Normal breath sounds.   Abdominal:      Palpations: Abdomen is soft.   Musculoskeletal:         General: Normal range of motion.      Cervical back: Normal range of motion and neck supple.   Skin:     General: Skin is warm and dry.      Coloration: Skin is not jaundiced or pale.      Findings: Erythema present. No bruising, lesion or rash.   Neurological:      General: No focal deficit present.      Mental Status: He is alert.   Psychiatric:         Mood and Affect: Mood normal.         Behavior: Behavior normal.         Thought Content: Thought content normal.         Judgment: Judgment normal.         1. Chronic obstructive pulmonary disease, unspecified COPD type    2. Anxiety    3. SOB (shortness of breath)    4. Pressure injury of sacral region, stage 2    5. Pustule        Plan:     Problem List Items Addressed This Visit          Psychiatric    Anxiety       Pulmonary    Chronic obstructive pulmonary disease - Primary    SOB (shortness of breath)       ID    Pustule       Orthopedic    Pressure injury of sacral region, stage 2     No follow-ups on file.  Continue 02, nebs , etc.  For decub.,  continue santyl, ab.  For pustule, procedure: cleaned with betadine, 1cc lidocaine sc.sharp I/d with scalple. Pus brought forth. Cs done. Wound packed with iodoform gauze.education to wife on pulling out gauze, wound care. Fu 2-3 weeks    I am having Geoffrey Gleason maintain his levalbuterol, DALIRESP, budesonide, levalbuterol, gabapentin, ketoconazole, ciclopirox, tamsulosin, arformoteroL, cetirizine, aspirin, OXYGEN-AIR DELIVERY SYSTEMS MISC, levoFLOXacin, metOLazone, furosemide, fluticasone propionate, OXYGEN-AIR DELIVERY SYSTEMS MISC, diltiaZEM, traZODone, SantyL, sulfamethoxazole-trimethoprim 800-160mg, mometasone 0.1%, and predniSONE.    Geoffrey was seen today for non-healing wound follow up and pressure ulcer.    Diagnoses and all orders for this visit:    Chronic obstructive pulmonary disease, unspecified COPD type    Anxiety    SOB (shortness of breath)    Pressure injury of sacral region, stage 2    Pustule         [unfilled]  No orders of the defined types were placed in this encounter.

## 2025-01-11 LAB — MICROORGANISM SPEC CULT: NORMAL

## 2025-01-13 ENCOUNTER — DOCUMENT SCAN (OUTPATIENT)
Dept: HOME HEALTH SERVICES | Facility: HOSPITAL | Age: 75
End: 2025-01-13
Payer: MEDICARE

## 2025-01-16 ENCOUNTER — DOCUMENTATION ONLY (OUTPATIENT)
Dept: FAMILY MEDICINE | Facility: CLINIC | Age: 75
End: 2025-01-16
Payer: MEDICARE

## 2025-01-16 NOTE — PROGRESS NOTES
Message from :     VITALS SIGNS   TEMP: 98.6   HR: 80   RR: 20   BP: 123/70   O2SATS: 86% ON 3L NC     PROBLEM/CONCERN   THE PATIENTS OXYGEN CONCENTRATOR IS READING ERROR AND MAKING A BEEPING SOUND. THE PATIENTS WIFE HAS ALREADY CONTACTED THE COMPANY DUE TO THIS ISSUE. THEY ARE SENDING A TECH OUT TODAY TO FIX THE ISSUE. DURING THE VISIT THEY HAD NOT YET ARRIVED AND THE PATIENTS OXYGEN IS 86% AT REST. HE REPORTS EVER SINCE HIS MACHINE STARTED TO MESS UP HIS OXYGEN HAS DECREASED AND HE HAS BEEN MORE SHORT OF BREATH. HE DENIES ANY ACUTE DISTRESS OR THE NEED TO GO TO THE ER AT THIS TIME. INSTRUCTED THE PATIENT TO CALL [facility] IF HE GETS ANY WORSE. ALSO INSTRUCTED THE PATIENT TO GO TO THE ER IF HE FEELS LIKE HE CANT BREATH OR HIS MENTAL STATUS CHANGES. THE PATIENT AGREES. INFORMED THE PATIENT THAT  WILL CALL HIM LATER TO CHECK ON HIM. PATIENTS BACK UP WAS NOT WORKING EITHER.     NURSE SPOKE WITH PATIENT LATER LAST NIGHT AND HE REPORT THEY BROUGHT HIM A NEW ONE AND HIS SAT WAS AT 94 AND HE STATES HE IS OK AND DOING GOOD.

## 2025-01-30 ENCOUNTER — OFFICE VISIT (OUTPATIENT)
Dept: WOUND CARE | Facility: HOSPITAL | Age: 75
End: 2025-01-30
Attending: FAMILY MEDICINE
Payer: MEDICARE

## 2025-01-30 VITALS
HEART RATE: 105 BPM | RESPIRATION RATE: 20 BRPM | TEMPERATURE: 98 F | SYSTOLIC BLOOD PRESSURE: 137 MMHG | DIASTOLIC BLOOD PRESSURE: 66 MMHG

## 2025-01-30 DIAGNOSIS — L05.91 CYST NEAR COCCYX: ICD-10-CM

## 2025-01-30 DIAGNOSIS — C61 PROSTATE CANCER: ICD-10-CM

## 2025-01-30 DIAGNOSIS — J96.11 CHRONIC RESPIRATORY FAILURE WITH HYPOXIA, ON HOME O2 THERAPY: ICD-10-CM

## 2025-01-30 DIAGNOSIS — J44.9 CHRONIC OBSTRUCTIVE PULMONARY DISEASE, UNSPECIFIED COPD TYPE: Primary | ICD-10-CM

## 2025-01-30 DIAGNOSIS — R06.02 SOB (SHORTNESS OF BREATH): ICD-10-CM

## 2025-01-30 DIAGNOSIS — Z99.81 CHRONIC RESPIRATORY FAILURE WITH HYPOXIA, ON HOME O2 THERAPY: ICD-10-CM

## 2025-01-30 PROCEDURE — 99214 OFFICE O/P EST MOD 30 MIN: CPT | Mod: ,,, | Performed by: FAMILY MEDICINE

## 2025-01-30 NOTE — PATIENT INSTRUCTIONS
Discontinue santyl and leave open to air.   Go to ER if any complications develop.     Diet:   Increase protein intake, avoid fried, fatty foods and foods high in simple carbs.   Vitamins:  Take vitamin C 1000 mg, zinc 50mg, vitamin d 5000 units, and a daily multivitamin. Sj is a good source of protein and nutrients to aid in wound healing.

## 2025-01-30 NOTE — PROGRESS NOTES
Subjective:      Patient ID: Geoffrey Gleason is a 74 y.o. male.    Chief Complaint: Non-healing Wound Follow Up    Geoffrey Gleason a 74 y.o. male presents for follow up on all regular problems which are reviewed and discussed.   Well.  Problem List Items Addressed This Visit          Pulmonary    Chronic obstructive pulmonary disease - Primary    SOB (shortness of breath)    Chronic respiratory failure with hypoxia, on home O2 therapy       ID    Cyst near coccyx       Oncology    Prostate cancer       Past Medical History:  Past Medical History:   Diagnosis Date    Cellulitis 04/14/2022    Cigarette smoker 03/03/2022    COPD (chronic obstructive pulmonary disease)     COPD exacerbation 02/09/2024    COPD with acute exacerbation 11/28/2023    Left flank pain 06/17/2024    Left otitis media 04/01/2024    Middle ear effusion, right 05/08/2024    Non-recurrent acute serous otitis media of both ears 09/12/2024    Non-recurrent acute suppurative otitis media of left ear without spontaneous rupture of tympanic membrane 05/08/2024    Pneumonia of left lower lobe due to infectious organism 04/12/2024    Shortness of breath 03/03/2022    Swelling of both ankles 04/14/2022    Swelling of both lower extremities 04/18/2022     History reviewed. No pertinent surgical history.  Review of patient's allergies indicates:   Allergen Reactions    Cephalexin Hives and Other (See Comments)     Swelling SOB      Amoxicillin Rash     Current Outpatient Medications on File Prior to Visit   Medication Sig Dispense Refill    arformoteroL (BROVANA) 15 mcg/2 mL Nebu 2 ml Inhalation Twice a day      aspirin 81 MG Chew Take 81 mg by mouth once daily.      budesonide (PULMICORT) 0.5 mg/2 mL nebulizer solution 2 ml      cetirizine (ZYRTEC) 10 MG tablet 1 tablet Orally Once a day for 30 days      collagenase (SANTYL) ointment Apply topically once daily. 90 g 0    DALIRESP 500 mcg Tab Take 1 tablet by mouth once daily.      diltiaZEM (CARDIZEM CD) 180  MG 24 hr capsule Take 1 capsule (180 mg total) by mouth once daily. 90 capsule 0    fluticasone propionate (FLONASE ALLERGY RELIEF) 50 mcg/actuation nasal spray 1 spray (50 mcg total) by Each Nostril route Daily. 16 g 2    furosemide (LASIX) 20 MG tablet Take 1 tablet (20 mg total) by mouth once daily. As needed for swelling 90 tablet 1    levalbuterol (XOPENEX HFA) 45 mcg/actuation inhaler SMARTSI Puff(s) By Mouth Every 6 Hours PRN      levalbuterol (XOPENEX) 1.25 mg/3 mL nebulizer solution USE ONE vial via NEBULIZER EVERY 8 HOURS AS NEEDED      mometasone 0.1% (ELOCON) 0.1 % cream Apply topically once daily. 45 g 5    OXYGEN-AIR DELIVERY SYSTEMS MISC 2 L by Nasal route Daily.      OXYGEN-AIR DELIVERY SYSTEMS MISC 2 L by Nasal route.      predniSONE (DELTASONE) 20 MG tablet Take 2 tablets (40 mg total) by mouth once daily. 10 tablet 1    sulfamethoxazole-trimethoprim 800-160mg (BACTRIM DS) 800-160 mg Tab Take 1 tablet by mouth 2 (two) times daily. 60 tablet 1    tamsulosin (FLOMAX) 0.4 mg Cap Take 1 capsule (0.4 mg total) by mouth once daily. 90 capsule 3    traZODone (DESYREL) 100 MG tablet Take 1.5 tablets (150 mg total) by mouth every evening. 135 tablet 1    ciclopirox (LOPROX) 0.77 % Crea Apply topically 2 (two) times daily. (Patient not taking: Reported on 2025)      gabapentin (NEURONTIN) 300 MG capsule Take 1 capsule (300 mg total) by mouth every evening. (Patient not taking: Reported on 2025) 90 capsule 1    ketoconazole (NIZORAL) 2 % shampoo SMARTSIG:Topical 2-3 Times Weekly (Patient not taking: Reported on 2025)      levoFLOXacin (LEVAQUIN) 500 MG tablet Take 1 tablet (500 mg total) by mouth once daily. (Patient not taking: Reported on 2025) 5 tablet 0    metOLazone (ZAROXOLYN) 5 MG tablet 1 tablet Orally Once a day for 30 day(s) (Patient not taking: Reported on 2025)       No current facility-administered medications on file prior to visit.     Social History      Socioeconomic History    Marital status:    Tobacco Use    Smoking status: Former     Current packs/day: 0.00     Average packs/day: 1 pack/day for 56.7 years (56.7 ttl pk-yrs)     Types: Cigarettes     Start date:      Quit date: 2024     Years since quittin.3     Passive exposure: Current    Smokeless tobacco: Never    Tobacco comments:     He is currently on nicotine patches   Substance and Sexual Activity    Alcohol use: Not Currently    Drug use: Never    Sexual activity: Yes     Social Drivers of Health     Financial Resource Strain: Not At Risk (2024)    Received from [facility]    BOC Financial Resource Needs     Financial/Environmental Concerns: Unrecognized value   Food Insecurity: Not At Risk (2024)    Received from [facility]    BOC Food Insecurity     Financial/Environmental Concerns: Unrecognized value   Transportation Needs: Not At Risk (2024)    Received from [facility]    BOC Transportation Needs     Financial/Environmental Concerns: Unrecognized value   Housing Stability: Not At Risk (2024)    Received from [facility]    BOC Housing Stability Source     Financial/Environmental Concerns: Unrecognized value     No family history on file.    Review of Systems   Constitutional: Negative.    HENT:  Negative for congestion, ear pain, nosebleeds and trouble swallowing.    Eyes:  Negative for pain and itching.   Respiratory:  Negative for chest tightness.    Cardiovascular:  Negative for chest pain.   Gastrointestinal:  Negative for abdominal distention.   Endocrine: Negative for cold intolerance and heat intolerance.   Genitourinary:  Negative for difficulty urinating.   Musculoskeletal:  Negative for arthralgias.   Skin:  Negative for color change, pallor, rash and wound.   Neurological:  Negative for dizziness.       Objective:     /66  (BP Location: Left arm)   Pulse 105   Temp 97.6 °F (36.4 °C) (Oral)   Resp 20     Physical Exam  Constitutional:       Appearance: Normal appearance. He is obese.   HENT:      Head: Normocephalic and atraumatic.      Right Ear: External ear normal.      Left Ear: External ear normal.      Nose: Nose normal.      Mouth/Throat:      Mouth: Mucous membranes are moist.      Pharynx: Oropharynx is clear.   Eyes:      Pupils: Pupils are equal, round, and reactive to light.   Cardiovascular:      Rate and Rhythm: Normal rate and regular rhythm.      Heart sounds: Normal heart sounds.   Pulmonary:      Effort: Pulmonary effort is normal.      Breath sounds: Normal breath sounds.   Abdominal:      Palpations: Abdomen is soft.   Musculoskeletal:         General: Normal range of motion.      Cervical back: Normal range of motion and neck supple.   Skin:     General: Skin is warm and dry.      Coloration: Skin is not jaundiced or pale.      Findings: Lesion present. No bruising, erythema or rash.   Neurological:      General: No focal deficit present.      Mental Status: He is alert.   Psychiatric:         Mood and Affect: Mood normal.         Behavior: Behavior normal.         Thought Content: Thought content normal.         Judgment: Judgment normal.         1. Chronic obstructive pulmonary disease, unspecified COPD type    2. SOB (shortness of breath)    3. Chronic respiratory failure with hypoxia, on home O2 therapy    4. Prostate cancer    5. Cyst near coccyx        Plan:     Problem List Items Addressed This Visit          Pulmonary    Chronic obstructive pulmonary disease - Primary    SOB (shortness of breath)    Chronic respiratory failure with hypoxia, on home O2 therapy       ID    Cyst near coccyx       Oncology    Prostate cancer     No follow-ups on file.  Dc [facility]. If lipoma like place gets gigger, call and will refer to surgery.  Could be cyst. Or other. Discussed with wife, pt.  We drained about a month ago cs  -.    I am having Geoffrey Gleason maintain his levalbuterol, DALIRESP, budesonide, levalbuterol, gabapentin, ketoconazole, ciclopirox, tamsulosin, arformoteroL, cetirizine, aspirin, OXYGEN-AIR DELIVERY SYSTEMS MISC, levoFLOXacin, metOLazone, furosemide, fluticasone propionate, OXYGEN-AIR DELIVERY SYSTEMS MISC, diltiaZEM, traZODone, SantyL, sulfamethoxazole-trimethoprim 800-160mg, mometasone 0.1%, and predniSONE.    Geoffrey was seen today for non-healing wound follow up.    Diagnoses and all orders for this visit:    Chronic obstructive pulmonary disease, unspecified COPD type    SOB (shortness of breath)    Chronic respiratory failure with hypoxia, on home O2 therapy    Prostate cancer    Cyst near coccyx         [unfilled]  No orders of the defined types were placed in this encounter.

## 2025-02-07 ENCOUNTER — EXTERNAL HOME HEALTH (OUTPATIENT)
Dept: HOME HEALTH SERVICES | Facility: HOSPITAL | Age: 75
End: 2025-02-07
Payer: MEDICARE

## 2025-02-13 ENCOUNTER — TELEPHONE (OUTPATIENT)
Dept: FAMILY MEDICINE | Facility: CLINIC | Age: 75
End: 2025-02-13
Payer: MEDICARE

## 2025-02-13 DIAGNOSIS — R00.0 TACHYCARDIA: ICD-10-CM

## 2025-02-13 RX ORDER — DILTIAZEM HYDROCHLORIDE 180 MG/1
180 CAPSULE, COATED, EXTENDED RELEASE ORAL DAILY
Qty: 90 CAPSULE | Refills: 3 | Status: SHIPPED | OUTPATIENT
Start: 2025-02-13 | End: 2026-02-08

## 2025-02-13 NOTE — TELEPHONE ENCOUNTER
----- Message from Monet sent at 2/13/2025 12:43 PM CST -----  diltiaZEM (CARDIZEM CD) 180 MG 24 hr capsule    [facility]. - [place] - [address]  [address] [place] [address]  Phone: [phone] Fax: [phone]    Pharmacy requests refill for Patient

## 2025-02-19 RX ORDER — PREDNISONE 20 MG/1
40 TABLET ORAL
Qty: 10 TABLET | Refills: 1 | Status: SHIPPED | OUTPATIENT
Start: 2025-02-19

## 2025-04-10 DIAGNOSIS — N40.1 BENIGN PROSTATIC HYPERPLASIA WITH LOWER URINARY TRACT SYMPTOMS, SYMPTOM DETAILS UNSPECIFIED: ICD-10-CM

## 2025-04-10 RX ORDER — TAMSULOSIN HYDROCHLORIDE 0.4 MG/1
1 CAPSULE ORAL DAILY
Qty: 90 CAPSULE | Refills: 3 | Status: SHIPPED | OUTPATIENT
Start: 2025-04-10

## 2025-04-24 ENCOUNTER — OFFICE VISIT (OUTPATIENT)
Dept: FAMILY MEDICINE | Facility: CLINIC | Age: 75
End: 2025-04-24
Payer: MEDICARE

## 2025-04-24 VITALS
SYSTOLIC BLOOD PRESSURE: 132 MMHG | DIASTOLIC BLOOD PRESSURE: 61 MMHG | RESPIRATION RATE: 20 BRPM | WEIGHT: 153 LBS | HEART RATE: 80 BPM | BODY MASS INDEX: 23.19 KG/M2 | TEMPERATURE: 99 F | OXYGEN SATURATION: 92 % | HEIGHT: 68 IN

## 2025-04-24 DIAGNOSIS — I50.9 CHRONIC CONGESTIVE HEART FAILURE, UNSPECIFIED HEART FAILURE TYPE: ICD-10-CM

## 2025-04-24 DIAGNOSIS — C61 PROSTATE CANCER: ICD-10-CM

## 2025-04-24 DIAGNOSIS — Z12.5 SCREENING FOR PROSTATE CANCER: ICD-10-CM

## 2025-04-24 DIAGNOSIS — I48.91 ATRIAL FIBRILLATION, UNSPECIFIED TYPE: ICD-10-CM

## 2025-04-24 DIAGNOSIS — R60.9 EDEMA, UNSPECIFIED TYPE: ICD-10-CM

## 2025-04-24 DIAGNOSIS — B37.0 ORAL CANDIDIASIS: ICD-10-CM

## 2025-04-24 DIAGNOSIS — J44.1 COPD EXACERBATION: Primary | ICD-10-CM

## 2025-04-24 DIAGNOSIS — R05.3 CHRONIC COUGH: ICD-10-CM

## 2025-04-24 DIAGNOSIS — G47.09 OTHER INSOMNIA: ICD-10-CM

## 2025-04-24 PROBLEM — L89.323 STAGE III PRESSURE ULCER OF LEFT BUTTOCK: Status: ACTIVE | Noted: 2025-04-24

## 2025-04-24 LAB
ALBUMIN SERPL BCP-MCNC: 3.3 G/DL (ref 3.4–4.8)
ALBUMIN/GLOB SERPL: 1.2 {RATIO}
ALP SERPL-CCNC: 66 U/L (ref 40–150)
ALT SERPL W P-5'-P-CCNC: 8 U/L
ANION GAP SERPL CALCULATED.3IONS-SCNC: 11 MMOL/L (ref 7–16)
AST SERPL W P-5'-P-CCNC: 32 U/L (ref 11–45)
BASOPHILS # BLD AUTO: 0.06 K/UL (ref 0–0.2)
BASOPHILS NFR BLD AUTO: 0.8 % (ref 0–1)
BILIRUB SERPL-MCNC: 0.9 MG/DL
BUN SERPL-MCNC: 11 MG/DL (ref 8–26)
BUN/CREAT SERPL: 16 (ref 6–20)
CALCIUM SERPL-MCNC: 8.7 MG/DL (ref 8.8–10)
CHLORIDE SERPL-SCNC: 90 MMOL/L (ref 98–107)
CO2 SERPL-SCNC: 36 MMOL/L (ref 23–31)
CREAT SERPL-MCNC: 0.67 MG/DL (ref 0.72–1.25)
DIFFERENTIAL METHOD BLD: ABNORMAL
EGFR (NO RACE VARIABLE) (RUSH/TITUS): 97 ML/MIN/1.73M2
EOSINOPHIL # BLD AUTO: 0.22 K/UL (ref 0–0.5)
EOSINOPHIL NFR BLD AUTO: 2.8 % (ref 1–4)
ERYTHROCYTE [DISTWIDTH] IN BLOOD BY AUTOMATED COUNT: 13.3 % (ref 11.5–14.5)
GLOBULIN SER-MCNC: 2.8 G/DL (ref 2–4)
GLUCOSE SERPL-MCNC: 101 MG/DL (ref 82–115)
HCT VFR BLD AUTO: 40.4 % (ref 40–54)
HGB BLD-MCNC: 12.5 G/DL (ref 13.5–18)
IMM GRANULOCYTES # BLD AUTO: 0.03 K/UL (ref 0–0.04)
IMM GRANULOCYTES NFR BLD: 0.4 % (ref 0–0.4)
LYMPHOCYTES # BLD AUTO: 0.9 K/UL (ref 1–4.8)
LYMPHOCYTES NFR BLD AUTO: 11.3 % (ref 27–41)
MCH RBC QN AUTO: 28.3 PG (ref 27–31)
MCHC RBC AUTO-ENTMCNC: 30.9 G/DL (ref 32–36)
MCV RBC AUTO: 91.4 FL (ref 80–96)
MONOCYTES # BLD AUTO: 0.68 K/UL (ref 0–0.8)
MONOCYTES NFR BLD AUTO: 8.5 % (ref 2–6)
MPC BLD CALC-MCNC: 10 FL (ref 9.4–12.4)
NEUTROPHILS # BLD AUTO: 6.07 K/UL (ref 1.8–7.7)
NEUTROPHILS NFR BLD AUTO: 76.2 % (ref 53–65)
NRBC # BLD AUTO: 0 X10E3/UL
NRBC, AUTO (.00): 0 %
NT-PROBNP SERPL-MCNC: 273 PG/ML (ref 1–450)
PLATELET # BLD AUTO: 204 K/UL (ref 150–400)
POTASSIUM SERPL-SCNC: 3.9 MMOL/L (ref 3.5–5.1)
PROT SERPL-MCNC: 6.1 G/DL (ref 5.8–7.6)
PSA SERPL-MCNC: 0.08 NG/ML
RBC # BLD AUTO: 4.42 M/UL (ref 4.6–6.2)
SODIUM SERPL-SCNC: 133 MMOL/L (ref 136–145)
WBC # BLD AUTO: 7.96 K/UL (ref 4.5–11)

## 2025-04-24 PROCEDURE — 80053 COMPREHEN METABOLIC PANEL: CPT | Mod: ,,, | Performed by: CLINICAL MEDICAL LABORATORY

## 2025-04-24 PROCEDURE — 84153 ASSAY OF PSA TOTAL: CPT | Mod: ,,, | Performed by: CLINICAL MEDICAL LABORATORY

## 2025-04-24 PROCEDURE — 83880 ASSAY OF NATRIURETIC PEPTIDE: CPT | Mod: ,,, | Performed by: CLINICAL MEDICAL LABORATORY

## 2025-04-24 PROCEDURE — 85025 COMPLETE CBC W/AUTO DIFF WBC: CPT | Mod: ,,, | Performed by: CLINICAL MEDICAL LABORATORY

## 2025-04-24 RX ORDER — TRAZODONE HYDROCHLORIDE 100 MG/1
150 TABLET ORAL NIGHTLY
Qty: 135 TABLET | Refills: 1 | Status: CANCELLED | OUTPATIENT
Start: 2025-04-24 | End: 2025-10-21

## 2025-04-24 RX ORDER — FUROSEMIDE 20 MG/1
20 TABLET ORAL DAILY
Qty: 90 TABLET | Refills: 1 | Status: CANCELLED | OUTPATIENT
Start: 2025-04-24 | End: 2026-04-24

## 2025-04-24 RX ORDER — ROFLUMILAST 500 UG/1
1 TABLET ORAL DAILY
Status: CANCELLED | OUTPATIENT
Start: 2025-04-24

## 2025-04-24 RX ORDER — BUDESONIDE 0.5 MG/2ML
INHALANT ORAL
Status: CANCELLED | OUTPATIENT
Start: 2025-04-24

## 2025-04-24 RX ORDER — LINCOMYCIN HYDROCHLORIDE 300 MG/ML
600 INJECTION, SOLUTION INTRAMUSCULAR; INTRAVENOUS; SUBCONJUNCTIVAL
Status: COMPLETED | OUTPATIENT
Start: 2025-04-24 | End: 2025-04-24

## 2025-04-24 RX ORDER — LEVALBUTEROL TARTRATE 45 UG/1
AEROSOL, METERED ORAL
Qty: 15 G | Status: CANCELLED | OUTPATIENT
Start: 2025-04-24

## 2025-04-24 RX ORDER — LEVALBUTEROL INHALATION SOLUTION 1.25 MG/3ML
SOLUTION RESPIRATORY (INHALATION)
Status: CANCELLED | OUTPATIENT
Start: 2025-04-24

## 2025-04-24 RX ORDER — DOXYCYCLINE 100 MG/1
100 CAPSULE ORAL EVERY 12 HOURS
Qty: 20 CAPSULE | Refills: 0 | Status: SHIPPED | OUTPATIENT
Start: 2025-04-24 | End: 2025-05-04

## 2025-04-24 RX ADMIN — LINCOMYCIN HYDROCHLORIDE 600 MG: 300 INJECTION, SOLUTION INTRAMUSCULAR; INTRAVENOUS; SUBCONJUNCTIVAL at 04:04

## 2025-04-25 ENCOUNTER — RESULTS FOLLOW-UP (OUTPATIENT)
Dept: FAMILY MEDICINE | Facility: CLINIC | Age: 75
End: 2025-04-25

## 2025-04-25 PROBLEM — L89.152 PRESSURE INJURY OF SACRAL REGION, STAGE 2: Status: RESOLVED | Noted: 2024-12-05 | Resolved: 2025-04-25

## 2025-04-25 PROBLEM — J44.1 COPD EXACERBATION: Status: ACTIVE | Noted: 2025-04-25

## 2025-04-25 PROBLEM — I48.91 ATRIAL FIBRILLATION, UNSPECIFIED TYPE: Status: ACTIVE | Noted: 2025-04-25

## 2025-04-25 PROBLEM — L89.323 STAGE III PRESSURE ULCER OF LEFT BUTTOCK: Status: RESOLVED | Noted: 2025-04-24 | Resolved: 2025-04-25

## 2025-04-25 PROBLEM — B37.0 ORAL CANDIDIASIS: Status: ACTIVE | Noted: 2025-04-25

## 2025-04-25 PROBLEM — Z12.5 SCREENING FOR PROSTATE CANCER: Status: ACTIVE | Noted: 2025-04-25

## 2025-04-25 RX ORDER — NYSTATIN 100000 [USP'U]/ML
4 SUSPENSION ORAL 4 TIMES DAILY
Qty: 160 ML | Refills: 0 | Status: SHIPPED | OUTPATIENT
Start: 2025-04-25 | End: 2025-05-05

## 2025-04-25 NOTE — PROGRESS NOTES
Please let pt know labs are good! PSA is normal and the lab I checked that checks for fluid is normal as well. They should be hearing from . Thanks!

## 2025-04-25 NOTE — PROGRESS NOTES
LELAND Elizabeth   [facility]  [address]  [place] [address]     PATIENT NAME: Geoffrey Gleason  : 1950  DATE: 25  MRN: [id number]      Billing Provider: LELAND Elizabeth  Level of Service:   Patient PCP Information       Provider PCP Type    LELAND Elizabeth [facility]            Reason for Visit / Chief Complaint: Cough (Says cough worsened about 3 weeks ago.), Shortness of Breath (Pt is 02 dependent), and Sinus Problem (Mr.Tommy STEVIE Gleason is a 74 y.o. male who presents to the clinic today with sinus problems. Cough with thick white/brown production. Nasal congestion. )   Health Maintenance Due   Topic Date Due    Hepatitis C Screening  Never done    Shingles Vaccine (1 of 2) Never done    LDCT Lung Screen  2022    DEXA Scan  2023          Update PCP  Update Chief Complaint         History of Present Illness / Problem Focused Workflow     History of Present Illness    CHIEF COMPLAINT:  Patient presents today with increased coughing and congestion. Declines LDCT and DEXA for now.     RESPIRATORY:  He reports coughing up mucus for approximately one month with nasal congestion. He notes small amounts of blood when blowing his nose forcefully and sensation of water in his ear. Shortness of breath has improved when at rest. He denies sore throat. He requires oxygen therapy at 3L baseline.    CURRENT MEDICATIONS:  He takes Lasix 20mg daily, Trazodone at night, and uses nebulizer and inhaler as prescribed.    SPECIALTY CARE:  He follows with pulmonology with last visit in January and next scheduled appointment in July. He is no longer following with urology. Recently discharged from wound care after wound healing.      ROS:  General: -fever, -chills, -fatigue, -weight gain, +weight loss  Eyes: -vision changes, -redness, -discharge  ENT: -ear pain, +nasal congestion, -sore throat, +nosebleeds, +ear pressure, +mouth lesions  Cardiovascular: -chest pain, -palpitations, +lower  "extremity edema  Respiratory: +cough, +shortness of breath, +productive cough, +exertional dyspnea  Gastrointestinal: -abdominal pain, -nausea, -vomiting, -diarrhea, -constipation, -blood in stool  Genitourinary: -dysuria, -hematuria, -frequency  Musculoskeletal: -joint pain, -muscle pain  Skin: -rash, -lesion  Neurological: -headache, -dizziness, -numbness, -tingling  Psychiatric: -anxiety, -depression, -sleep difficulty          No results found for: "HGBA1C"     CMP  Sodium   Date Value Ref Range Status   04/24/2025 133 (L) 136 - 145 mmol/L Final     Potassium   Date Value Ref Range Status   04/24/2025 3.9 3.5 - 5.1 mmol/L Final     Chloride   Date Value Ref Range Status   04/24/2025 90 (L) 98 - 107 mmol/L Final     CO2   Date Value Ref Range Status   04/24/2025 36 (H) 23 - 31 mmol/L Final     Glucose   Date Value Ref Range Status   04/24/2025 101 82 - 115 mg/dL Final     BUN   Date Value Ref Range Status   04/24/2025 11 8 - 26 mg/dL Final     Creatinine   Date Value Ref Range Status   04/24/2025 0.67 (L) 0.72 - 1.25 mg/dL Final     Calcium   Date Value Ref Range Status   04/24/2025 8.7 (L) 8.8 - 10.0 mg/dL Final     Total Protein   Date Value Ref Range Status   04/24/2025 6.1 5.8 - 7.6 g/dL Final     Albumin   Date Value Ref Range Status   04/24/2025 3.3 (L) 3.4 - 4.8 g/dL Final     Bilirubin, Total   Date Value Ref Range Status   04/24/2025 0.9 <=1.5 mg/dL Final     Alk Phos   Date Value Ref Range Status   04/24/2025 66 40 - 150 U/L Final     AST   Date Value Ref Range Status   04/24/2025 32 11 - 45 U/L Final     ALT   Date Value Ref Range Status   04/24/2025 8 <=55 U/L Final     Anion Gap   Date Value Ref Range Status   04/24/2025 11 7 - 16 mmol/L Final     eGFR   Date Value Ref Range Status   04/24/2025 97 >=60 mL/min/1.73m2 Final     Comment:     Estimated GFR calculated using the CKD-EPI creatinine (2021) equation.        Lab Results   Component Value Date    WBC 7.96 04/24/2025    RBC 4.42 (L) 04/24/2025 " "   HGB 12.5 (L) 04/24/2025    HCT 40.4 04/24/2025    MCV 91.4 04/24/2025    MCH 28.3 04/24/2025    MCHC 30.9 (L) 04/24/2025    RDW 13.3 04/24/2025     04/24/2025    MPV 10.0 04/24/2025    LYMPH 11.3 (L) 04/24/2025    LYMPH 0.90 (L) 04/24/2025    MONO 8.5 (H) 04/24/2025    EOS 0.22 04/24/2025    BASO 0.06 04/24/2025    EOSINOPHIL 2.8 04/24/2025    BASOPHIL 0.8 04/24/2025        No results found for: "CHOL"  No results found for: "HDL"  No results found for: "LDLCALC"  No results found for: "TRIG"  No results found for: "CHOLHDL"     Wt Readings from Last 3 Encounters:   04/24/25 1506 69.4 kg (153 lb)   11/26/24 1023 65.5 kg (144 lb 6.4 oz)   09/12/24 1318 70.6 kg (155 lb 9.6 oz)        BP Readings from Last 3 Encounters:   04/24/25 132/61   01/30/25 137/66   01/09/25 135/73        Review of Systems     Review of Systems       Medical / Social / Family History     Past Medical History:   Diagnosis Date    Cellulitis 04/14/2022    Cigarette smoker 03/03/2022    COPD (chronic obstructive pulmonary disease)     COPD exacerbation 02/09/2024    COPD with acute exacerbation 11/28/2023    Left flank pain 06/17/2024    Left otitis media 04/01/2024    Middle ear effusion, right 05/08/2024    Non-recurrent acute serous otitis media of both ears 09/12/2024    Non-recurrent acute suppurative otitis media of left ear without spontaneous rupture of tympanic membrane 05/08/2024    Pneumonia of left lower lobe due to infectious organism 04/12/2024    Shortness of breath 03/03/2022    Swelling of both ankles 04/14/2022    Swelling of both lower extremities 04/18/2022       History reviewed. No pertinent surgical history.    Social History    reports that he quit smoking about 6 months ago. His smoking use included cigarettes. He started smoking about 57 years ago. He has a 56.7 pack-year smoking history. He has been exposed to tobacco smoke. He has never used smokeless tobacco. He reports that he does not currently use " alcohol. He reports that he does not use drugs.    Family History  Mr.'s family history is not on file.    Medications and Allergies     Medications  Outpatient Medications Marked as Taking for the 25 encounter (Office Visit) with Moon Shields FNP   Medication Sig Dispense Refill    arformoteroL (BROVANA) 15 mcg/2 mL Nebu 2 ml Inhalation Twice a day      aspirin 81 MG Chew Take 81 mg by mouth once daily.      budesonide (PULMICORT) 0.5 mg/2 mL nebulizer solution 2 ml      cetirizine (ZYRTEC) 10 MG tablet 1 tablet Orally Once a day for 30 days      DALIRESP 500 mcg Tab Take 1 tablet by mouth once daily.      diltiaZEM (CARDIZEM CD) 180 MG 24 hr capsule Take 1 capsule (180 mg total) by mouth once daily. 90 capsule 3    fluticasone propionate (FLONASE ALLERGY RELIEF) 50 mcg/actuation nasal spray 1 spray (50 mcg total) by Each Nostril route Daily. 16 g 2    furosemide (LASIX) 20 MG tablet Take 1 tablet (20 mg total) by mouth once daily. As needed for swelling 90 tablet 1    levalbuterol (XOPENEX HFA) 45 mcg/actuation inhaler SMARTSI Puff(s) By Mouth Every 6 Hours PRN      levalbuterol (XOPENEX) 1.25 mg/3 mL nebulizer solution USE ONE vial via NEBULIZER EVERY 8 HOURS AS NEEDED      OXYGEN-AIR DELIVERY SYSTEMS MISC 2 L by Nasal route Daily.      tamsulosin (FLOMAX) 0.4 mg Cap Take 1 capsule (0.4 mg total) by mouth once daily. 90 capsule 3    traZODone (DESYREL) 100 MG tablet Take 1.5 tablets (150 mg total) by mouth every evening. 135 tablet 1    [DISCONTINUED] predniSONE (DELTASONE) 20 MG tablet TAKE TWO TABLETS BY MOUTH DAILY 10 tablet 1     Current Facility-Administered Medications for the 25 encounter (Office Visit) with Moon Shields FNP   Medication Dose Route Frequency Provider Last Rate Last Admin    [COMPLETED] lincomycin injection 600 mg  600 mg Intramuscular 1 time in Clinic/HOD Moon Shields FNP   600 mg at 25 1620       Allergies  Review of patient's allergies indicates:   Allergen  "Reactions    Cephalexin Hives and Other (See Comments)     Swelling SOB      Amoxicillin Rash       Physical Examination     Vitals:    25 1506   BP: 132/61   Pulse: 80   Resp: 20   Temp: 99.4 °F (37.4 °C)   TempSrc: Oral   SpO2: (!) 92%   Weight: 69.4 kg (153 lb)   Height: 5' 8" (1.727 m)   PF: (!) 3 L/min      Physical Exam  Constitutional:       Appearance: Normal appearance.      Interventions: Nasal cannula in place.      Comments: O2 @ 2L per NC via portable oxygen   HENT:      Head: Normocephalic.      Right Ear: Hearing, ear canal and external ear normal. A middle ear effusion is present.      Left Ear: Hearing, ear canal and external ear normal. A middle ear effusion is present. Tympanic membrane is erythematous.      Nose: Congestion present.      Right Turbinates: Swollen.      Left Turbinates: Swollen.      Mouth/Throat:      Mouth: Mucous membranes are moist.      Pharynx: Oropharynx is clear.      Comments: Oral thrush noted to palate  Eyes:      Extraocular Movements: Extraocular movements intact.   Cardiovascular:      Rate and Rhythm: Normal rate and regular rhythm.      Pulses: Normal pulses.      Heart sounds: Normal heart sounds.   Pulmonary:      Effort: Pulmonary effort is normal.      Breath sounds: Normal breath sounds.   Musculoskeletal:      Right lower le+ Edema present.      Left lower le+ Edema present.   Skin:     General: Skin is warm and dry.      Capillary Refill: Capillary refill takes less than 2 seconds.   Neurological:      General: No focal deficit present.      Mental Status: He is alert and oriented to person, place, and time.   Psychiatric:         Mood and Affect: Mood normal.         Behavior: Behavior normal.          Assessment and Plan (including Health Maintenance)      Problem List  Smart Sets  Document Outside HM   :    Plan:     There are no Patient Instructions on file for this visit.       Health Maintenance Due   Topic Date Due    Hepatitis C " Screening  Never done    Shingles Vaccine (1 of 2) Never done    LDCT Lung Screen  11/01/2022    DEXA Scan  09/21/2023       Problem List Items Addressed This Visit       Atrial fibrillation, unspecified type    Chronic congestive heart failure    Chronic cough    Relevant Orders    CBC Auto Differential (Completed)    Comprehensive Metabolic Panel (Completed)    COPD exacerbation - Primary    Relevant Medications    lincomycin injection 600 mg (Completed)    doxycycline (VIBRAMYCIN) 100 MG Cap    Other Relevant Orders    CBC Auto Differential (Completed)    Comprehensive Metabolic Panel (Completed)    Edema    Relevant Orders    CBC Auto Differential (Completed)    Comprehensive Metabolic Panel (Completed)    NT-Pro Natriuretic Peptide (Completed)    Other insomnia    Prostate cancer    Relevant Orders    PSA, Total (Diagnostic) (Completed)    Screening for prostate cancer    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  - Redness in one ear and possible fluid buildup.  - White patches in mouth, likely oral thrush due to inhaler use.  - Leg swelling, determining need for increased Lasix dosage and home health monitoring.  - Opted for antibiotic shot and oral antibiotic to address respiratory infection.    UNSPECIFIED ATRIAL FIBRILLATION:  - Ordered BNP test.  - Patient's oxygen level is improved, and shortness of breath has decreased.  - However, patient has been experiencing coughing and congestion for approximately 1 month.  - Physical exam revealed one erythematous ear, white patches in the oral cavity (likely due to inhaler use), and some  lower extremity edema.    REVIEWED RECENT PULMONOLOGY VISIT AND UPCOMING APPOINTMENTS. TO ADDRESS RESPIRATORY ISSUES AND EDEMA:  - 1.  - Administered antibiotic injection and prescribed oral antibiotics. 2.  - Increased Lasix dosage to twice daily for 3 days. 3.  - Prescribed antifungal medication for oral candidiasis. 4.  - Reinitiated home health care to monitor patient's condition, particularly edema/copd.    BRONCHITIS AND CHRONIC COUGH:  - Lung auscultation did not detect any wheezing.  - Patient's SpO2 is better than usual, currently using 2 L of oxygen instead of the typical 3 L.    EPISTAXIS:  - Patient reports minimal epistaxis when blowing nose forcefully.    CANDIDAL STOMATITIS:  - Observed white patches in the superior aspect of the patient's oral cavity.    LOCALIZED EDEMA:  - Noted edema in lower extremities that resolves overnight.  - Educated patient about the importance of elevating legs to reduce edema.  - Patient advised to prop up legs during the day to help reduce swelling.    OTITIS MEDIA (RIGHT EAR):  - Patient reports tinnitus resembling water in the ear.  - Observed erythema in the left ear during otoscopic exam.    DEPENDENCE ON SUPPLEMENTAL OXYGEN:  - Confirmed that the patient is on continuous oxygen therapy.    FOLLOW-UP AND ADDITIONAL CARE:  - Referred for home health services with [facility], requesting [place] as the assigned nurse if possible.  - Home health to schedule initial visit, ideally within the next few days.  - Ordered PSA test and labs.  - Patient to contact office with lab results, expected to be available the next day.        COPD exacerbation  -     CBC Auto Differential; Future; Expected date: 04/24/2025  -     Comprehensive Metabolic Panel; Future; Expected date: 04/24/2025  -     lincomycin injection 600 mg  -     doxycycline (VIBRAMYCIN) 100 MG Cap; Take 1 capsule (100 mg total) by mouth every 12 (twelve) hours. 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    Health Maintenance Due   Topic Date Due    Hepatitis C Screening  Never done    Shingles Vaccine (1 of 2) Never done    LDCT Lung Screen  11/01/2022    DEXA Scan  09/21/2023        Signature:  LELAND Elizabeth    Date of encounter: 4/24/25  This note was generated with the assistance of ambient listening technology. Verbal consent was obtained by the patient and accompanying visitor(s) for the recording of patient appointment to facilitate this note. I attest to having reviewed and edited the generated note for accuracy, though some syntax or spelling errors may persist. Please contact the author of this note for any clarification.

## 2025-05-05 DIAGNOSIS — J44.1 COPD EXACERBATION: Primary | ICD-10-CM

## 2025-05-05 RX ORDER — LEVOFLOXACIN 500 MG/1
500 TABLET, FILM COATED ORAL DAILY
Qty: 5 TABLET | Refills: 0 | Status: SHIPPED | OUTPATIENT
Start: 2025-05-05 | End: 2025-05-10

## 2025-05-05 NOTE — PROGRESS NOTES
ALLERGIES: AMOXICILLIN   CEPHALEXIN   PENICILLINS   PHARMACY: [facility] [place]     VITAL SIGNS   TEMP: 97.5   HR: 69   RR: 20   BP: 120/62   O2SATS: 93% ON 3L NC     PROBLEM/CONCERN   HE IS WEARING HIS OXYGEN 3L NC PER CONCENTRATOR. HE REPORTS HE FINISHED HIS ANTIBIOTIC LAST NIGHT. HE STATES HIS ANTIBIOTIC DID NOT HELP HIM. REPORTS IT DID NOT REALLY HELP. HE STATES HE IS STILL COUGHIMG UP GAYLE BROWN SPUTUM. REPORTS ONCE HE STARTS COUGHING ITS HARD FOR HIM TO STOP AND CATCH HIS BREATH.HE REPORTS HIS OXYGEN LEVELS WILL DROP BELOW 90 WHEN THIS HAPPENS AND IT TAKES A WHILE TO COME BACK UP ABOVE 92 ON 3 LITERS OXYGEN. LUNG SOUNDS ARE DIMINISHED IN ALL LUNG LOBES WHEN ASCULATED. HE HAS TRACE BILATERAL LOWER EXTREMITY EDEMA NOTED.     HE SAID HIS ANTIBIOTIC WAS DOXYCYCLINE     Levaquin prescribed. May take extra dose of lasix as needed for increased swelling/sob.

## 2025-05-22 DIAGNOSIS — G47.09 OTHER INSOMNIA: ICD-10-CM

## 2025-05-22 RX ORDER — TRAZODONE HYDROCHLORIDE 100 MG/1
200 TABLET ORAL NIGHTLY
Qty: 180 TABLET | Refills: 1 | Status: SHIPPED | OUTPATIENT
Start: 2025-05-22 | End: 2025-11-18

## 2025-05-22 NOTE — PROGRESS NOTES
VITAL SIGNS   TEMP: 97.4   HR: 86   RR: 18   BP: 118/62   O2SATS: 94% ON 3L NC     PROBLEM/CONCERN   RECIEVED PATIENT SITTING UP IN HIS USUAL CHAIR WATCHING TV. HIS WIFE IS PRESENT. HE DENIES ANY WORSENING SHORTNESS OF BREATH THAN HIS USUAL. HE IS WEARING HIS OXYGEN 3L NC PER CONCENTRATOR. PATIENT COMPLAINS OF HAVING A HARD TIME STAYING ASLEEP AT NIGHT. STATES HE IS ABLE TO FALL ASLEEP BUT WILL WAKE UP SEVERAL TIMES THROUGH THE NIGHT AND IS UNABLE TO FALL BACK ASLEEP. HE REQUESTS SOMETHING TO HELP HIM SLEEP BETTER. HE DOES TAKE TRAZADONE FOR SLEEP BUT STATES ITS NOT WORKING AS WELL AS IT USED TO.     ASSOCIATED MEDICATIONS   TRAZODONE 100 MG TABLET, 1.5 TABLET AT BEDTIME     Increase Trazodone to 2 tablets and will refer for sleep study if agrees.

## 2025-07-08 ENCOUNTER — EXTERNAL HOME HEALTH (OUTPATIENT)
Dept: HOME HEALTH SERVICES | Facility: HOSPITAL | Age: 75
End: 2025-07-08
Payer: MEDICARE

## 2025-08-04 ENCOUNTER — DOCUMENTATION ONLY (OUTPATIENT)
Dept: FAMILY MEDICINE | Facility: CLINIC | Age: 75
End: 2025-08-04
Payer: MEDICARE

## 2025-08-04 NOTE — PROGRESS NOTES
VITAL SIGNS   TEMP: 97.9   HR: 84   RR: 20   BP: 110/60   O2SATS: 92% ON 3L NC     PROBLEM/CONCERN   RECIEVED PATIENT SITTING UP IN HIS CHAIR. HE IS WEARING HIS OXYGEN PER CONCENTRATOR AT 3LNC. REPORTS HE FELL IN HIS BATHROOM LAST WEDNESDAY. HE STATES HE FELL AND HIT THE SIDE OF HIS BATH TUB. HE DENIES HITTING HIS HEAD OR THE NEED TO GO TO THE ER. HE HAS A SMALL ABRASION NOTED TO HIS LEFT FOREARM. REPORTS WHEN HE FELL INTO THE SIDE OF HIS BATHTUB IT PEELED THE SKIN BACK. THIS AREA HAS THE FIRST LAYER OF SKIN PEELED BACK AND IT IS RED IN COLOR. NO DRAINAGE NOTED AT THIS TIME. THE AREA IS HEALING WELL AND HAS STARTED TO FORM A SCAB AT THIS TIME. HE HAS NO OTHER INJURIES RELATED TO THIS FALL. HE REPORTS HE WAS ABLE TO GET HIS PREDNISONE REFILLED LAST WEEK BY DR. LEAHY. REPORTS HIS BREATHING IS MUCH BETTER WHEN HE TAKES HIS STERIODS. REPORTS PRODUCTIVE COUGH WITH GAYLE SPUTUM, MODERATE AMOUNT. HIS LUNGS ARE CLEAR BUT DIMINISHED WHEN ASCULTATED.